# Patient Record
Sex: FEMALE | Race: WHITE
[De-identification: names, ages, dates, MRNs, and addresses within clinical notes are randomized per-mention and may not be internally consistent; named-entity substitution may affect disease eponyms.]

---

## 2017-05-15 ENCOUNTER — HOSPITAL ENCOUNTER (OUTPATIENT)
Dept: HOSPITAL 47 - RADNMMAIN | Age: 72
Discharge: HOME | End: 2017-05-15
Payer: MEDICARE

## 2017-05-15 DIAGNOSIS — R94.31: Primary | ICD-10-CM

## 2017-05-15 PROCEDURE — 93017 CV STRESS TEST TRACING ONLY: CPT

## 2017-05-15 NOTE — EST
DATE OF SERVICE:  5/15/17



AGE:   72Y        SEX:  F        HT:    66"   WT:  132 lbs.           

Protocol Nba: X Other:   Stage: 1 Dur. of Exercise: 2:59



*Heart Rate         Blood Pressure                               

*Rest:  76          Rest:  162/106                               

*                              

*Max. Achieved:     139  Maximum BP:  191/77 

85% PMHR:  125

100% PMHR:  147          



*METS:  4.2    





INDICATIONS:  Abnormal EKG.



MEDICATIONS:   Vitamin D, Synthroid, ASA. 



 Mrs. Mercado is a 72-year-old female with history of about 

hypothyroidism, and has been having some chest discomfort. Patient was 

sent here for cardiac evaluation.  



Baseline EKG showed sinus rhythm with normal PA interval and QRS 

duration. Blood pressure at rest is 160/106 with pulse rate of 76. 

Patient walked on the Nba protocol for about 3 minutes achieving a 

maximum heart rate of 139 with a blood pressure of 190/77. EKGs taken 

during and after the exercise did not reveal any significant changes 

from the baseline. Occasional PVCs were noted.  



FINAL IMPRESSION:

1. Limited exercise capacity. 

2. Negative stress test at the level of exercise that patient achieved. 

3. Patient did not complain of any chest pain or shortness of breath.

## 2017-06-06 ENCOUNTER — HOSPITAL ENCOUNTER (OUTPATIENT)
Dept: HOSPITAL 47 - RADMAMWWP | Age: 72
Discharge: HOME | End: 2017-06-06
Payer: MEDICARE

## 2017-06-06 DIAGNOSIS — Z12.31: Primary | ICD-10-CM

## 2017-06-06 PROCEDURE — 77063 BREAST TOMOSYNTHESIS BI: CPT

## 2017-06-07 NOTE — MM
Reason for exam: screening  (asymptomatic).

Last mammogram was performed 1 year and 1 month ago.



History:

Patient is postmenopausal.

Family history of breast cancer in maternal aunt at age 70.

Excisional biopsy of the left breast, January 23, 2007.  Cancelled Left Mammotome 

of the left breast, January 22, 2007.

Taking estrogen for 16 years beginning at age 46.



Physical Findings:

A clinical breast exam by your physician is recommended on an annual basis and 

results should be correlated with mammographic findings.



MG 3D Screening Mammo W/Cad

Bilateral CC and MLO view(s) were taken.

Prior study comparison: May 18, 2016, bilateral MG 3d diag mammo w/cad [facility].  May 

26, 2015, bilateral MG screening mammo w CAD.

The breast tissue is heterogeneously dense. This may lower the sensitivity of 

mammography.

Finding: There are typically benign vascular calcifications.  There is no discrete

abnormality.

No significant changes in finding since May 18, 2016 and May 26, 2015.





ASSESSMENT: Benign, BI-RAD 2



RECOMMENDATION:

Routine screening mammogram of both breasts in 1 year.

## 2018-01-10 ENCOUNTER — HOSPITAL ENCOUNTER (OUTPATIENT)
Dept: HOSPITAL 47 - RADXRMAIN | Age: 73
Discharge: HOME | End: 2018-01-10
Payer: MEDICARE

## 2018-01-10 DIAGNOSIS — R05: Primary | ICD-10-CM

## 2018-01-10 PROCEDURE — 71046 X-RAY EXAM CHEST 2 VIEWS: CPT

## 2018-01-10 NOTE — XR
EXAMINATION TYPE: XR chest 2V

 

DATE OF EXAM: 1/10/2018

 

COMPARISON: NONE

 

HISTORY: Shortness of breath

 

TECHNIQUE:  Frontal and lateral views of the chest are obtained.

 

FINDINGS:

 

Scattered senescent parenchymal changes noted. Hyperinflation compatible with COPD. 

 

No evidence for infiltrate. No evidence for atelectasis.

 

Heart size is stable.

 

Mediastinal structures are stable and grossly unremarkable.

 

No evidence for hilar prominence.

 

Degenerative changes dorsal spine. 

 

IMPRESSION:

1. No evidence for acute pulmonary disease.

## 2018-06-07 ENCOUNTER — HOSPITAL ENCOUNTER (OUTPATIENT)
Dept: HOSPITAL 47 - RADMAMWWP | Age: 73
Discharge: HOME | End: 2018-06-07
Attending: SURGERY
Payer: MEDICARE

## 2018-06-07 DIAGNOSIS — Z12.31: Primary | ICD-10-CM

## 2018-06-07 PROCEDURE — 77063 BREAST TOMOSYNTHESIS BI: CPT

## 2018-06-07 PROCEDURE — 77067 SCR MAMMO BI INCL CAD: CPT

## 2018-06-11 NOTE — MM
Reason for exam: screening  (asymptomatic).

Last mammogram was performed 1 year ago.



History:

Patient is postmenopausal.

Family history of breast cancer in maternal aunt at age 70 and breast cancer in 

daughter at age 51.

Excisional biopsy of the left breast, January 23, 2007.  Cancelled Left Mammotome 

of the left breast, January 22, 2007.

Took estrogen for 16 years beginning at age 46.



Physical Findings:

A clinical breast exam by your physician is recommended on an annual basis and 

results should be correlated with mammographic findings.



MG 3D Screening Mammo W/Cad

Bilateral CC and MLO view(s) were taken.

Prior study comparison: June 6, 2017, bilateral MG 3d screening mammo w/cad.  May 

18, 2016, bilateral MG 3d diag mammo w/cad [facility].

There are scattered fibroglandular densities.  No significant changes when 

compared with prior studies.





ASSESSMENT: Benign, BI-RAD 2



RECOMMENDATION:

Routine screening mammogram of both breasts in 1 year.

## 2019-07-15 ENCOUNTER — HOSPITAL ENCOUNTER (OUTPATIENT)
Dept: HOSPITAL 47 - RADMAMWWP | Age: 74
Discharge: HOME | End: 2019-07-15
Attending: INTERNAL MEDICINE
Payer: MEDICARE

## 2019-07-15 DIAGNOSIS — Z12.31: Primary | ICD-10-CM

## 2019-07-15 PROCEDURE — 77067 SCR MAMMO BI INCL CAD: CPT

## 2019-07-15 PROCEDURE — 77063 BREAST TOMOSYNTHESIS BI: CPT

## 2019-07-16 NOTE — MM
Reason for exam: screening  (asymptomatic).

Last mammogram was performed 1 year and 1 month ago.



History:

Patient is postmenopausal.

Family history of breast cancer in maternal aunt at age 70 and breast cancer in 

daughter at age 51.

Excisional biopsy of the left breast, January 23, 2007.  Cancelled Left Mammotome 

of the left breast, January 22, 2007.

Took estrogen for 16 years beginning at age 46.



Physical Findings:

A clinical breast exam by your physician is recommended on an annual basis and 

results should be correlated with mammographic findings.



MG 3D Screening Mammo W/Cad

Bilateral CC and MLO view(s) were taken.

Prior study comparison: June 7, 2018, bilateral MG 3d screening mammo w/cad.  June 6, 2017, bilateral MG 3d screening mammo w/cad.

There are scattered fibroglandular densities.  No significant changes when 

compared with prior studies.





ASSESSMENT: Benign, BI-RAD 2



RECOMMENDATION:

Routine screening mammogram of both breasts in 1 year.

## 2020-08-28 ENCOUNTER — HOSPITAL ENCOUNTER (OUTPATIENT)
Dept: HOSPITAL 47 - RADMAMWWP | Age: 75
Discharge: HOME | End: 2020-08-28
Attending: INTERNAL MEDICINE
Payer: MEDICARE

## 2020-08-28 DIAGNOSIS — Z12.31: Primary | ICD-10-CM

## 2020-08-28 PROCEDURE — 77063 BREAST TOMOSYNTHESIS BI: CPT

## 2020-08-28 PROCEDURE — 77067 SCR MAMMO BI INCL CAD: CPT

## 2020-09-01 NOTE — MM
Reason for exam: screening  (asymptomatic).

Last mammogram was performed 1 year and 1 month ago.



History:

Patient is postmenopausal.

Family history of breast cancer in maternal aunt at age 70 and breast cancer in 

daughter at age 51.

Excisional biopsy of the left breast, January 23, 2007.  Cancelled Left Mammotome 

of the left breast, January 22, 2007.

Took estrogen for 16 years beginning at age 46.



Physical Findings:

A clinical breast exam by your physician is recommended on an annual basis and 

results should be correlated with mammographic findings.



MG 3D Screening Mammo W/Cad

Bilateral CC and MLO view(s) were taken.

Prior study comparison: July 15, 2019, bilateral MG 3d screening mammo w/cad.  

June 7, 2018, bilateral MG 3d screening mammo w/cad.

There are scattered fibroglandular densities.  Benign appearing bilateral 

calcifications.  No significant changes when compared with prior studies.





ASSESSMENT: Benign, BI-RAD 2



RECOMMENDATION:

Routine screening mammogram of both breasts in 1 year.

## 2021-09-08 ENCOUNTER — HOSPITAL ENCOUNTER (OUTPATIENT)
Dept: HOSPITAL 47 - RADMAMWWP | Age: 76
Discharge: HOME | End: 2021-09-08
Attending: INTERNAL MEDICINE
Payer: MEDICARE

## 2021-09-08 DIAGNOSIS — Z80.3: ICD-10-CM

## 2021-09-08 DIAGNOSIS — Z12.31: Primary | ICD-10-CM

## 2021-09-08 PROCEDURE — 77067 SCR MAMMO BI INCL CAD: CPT

## 2021-09-08 PROCEDURE — 77063 BREAST TOMOSYNTHESIS BI: CPT

## 2021-09-10 NOTE — MM
Reason for exam: screening  (asymptomatic).

Last mammogram was performed 1 year ago.



History:

Patient is postmenopausal.

Family history of breast cancer in maternal aunt at age 70 and breast cancer in 

daughter at age 51.

Excisional biopsy of the left breast, January 23, 2007.  Cancelled Left Mammotome 

of the left breast, January 22, 2007.

Took estrogen for 16 years beginning at age 46.



Physical Findings:

A clinical breast exam by your physician is recommended on an annual basis and 

results should be correlated with mammographic findings.



MG 3D Screening Mammo W/Cad

Bilateral CC and MLO view(s) were taken.

Prior study comparison: August 28, 2020, bilateral MG 3d screening mammo w/cad.  

July 15, 2019, bilateral MG 3d screening mammo w/cad.

The breast tissue is heterogeneously dense. This may lower the sensitivity of 

mammography.  No significant changes when compared with prior studies.





ASSESSMENT: Negative, BI-RAD 1



RECOMMENDATION:

Routine screening mammogram of both breasts in 1 year.

## 2021-09-20 ENCOUNTER — HOSPITAL ENCOUNTER (OUTPATIENT)
Dept: HOSPITAL 47 - OR | Age: 76
LOS: 1 days | Discharge: HOME | End: 2021-09-21
Attending: SURGERY
Payer: MEDICARE

## 2021-09-20 VITALS — BODY MASS INDEX: 19.3 KG/M2

## 2021-09-20 DIAGNOSIS — E78.5: ICD-10-CM

## 2021-09-20 DIAGNOSIS — K40.90: Primary | ICD-10-CM

## 2021-09-20 DIAGNOSIS — F41.9: ICD-10-CM

## 2021-09-20 DIAGNOSIS — Z82.3: ICD-10-CM

## 2021-09-20 DIAGNOSIS — Z79.899: ICD-10-CM

## 2021-09-20 DIAGNOSIS — R33.9: ICD-10-CM

## 2021-09-20 DIAGNOSIS — Z82.49: ICD-10-CM

## 2021-09-20 DIAGNOSIS — Z90.710: ICD-10-CM

## 2021-09-20 DIAGNOSIS — R03.0: ICD-10-CM

## 2021-09-20 DIAGNOSIS — Z79.890: ICD-10-CM

## 2021-09-20 DIAGNOSIS — Z83.2: ICD-10-CM

## 2021-09-20 DIAGNOSIS — E03.9: ICD-10-CM

## 2021-09-20 DIAGNOSIS — Z79.82: ICD-10-CM

## 2021-09-20 DIAGNOSIS — I10: ICD-10-CM

## 2021-09-20 LAB
ERYTHROCYTE [DISTWIDTH] IN BLOOD BY AUTOMATED COUNT: 4.78 M/UL (ref 3.8–5.4)
ERYTHROCYTE [DISTWIDTH] IN BLOOD: 13.2 % (ref 11.5–15.5)
HCT VFR BLD AUTO: 39.4 % (ref 34–46)
HGB BLD-MCNC: 13.4 GM/DL (ref 11.4–16)
MCH RBC QN AUTO: 28 PG (ref 25–35)
MCHC RBC AUTO-ENTMCNC: 33.9 G/DL (ref 31–37)
MCV RBC AUTO: 82.4 FL (ref 80–100)
PLATELET # BLD AUTO: 185 K/UL (ref 150–450)
WBC # BLD AUTO: 4.7 K/UL (ref 3.8–10.6)

## 2021-09-20 PROCEDURE — 85027 COMPLETE CBC AUTOMATED: CPT

## 2021-09-20 PROCEDURE — 49650 LAP ING HERNIA REPAIR INIT: CPT

## 2021-09-20 PROCEDURE — 87635 SARS-COV-2 COVID-19 AMP PRB: CPT

## 2021-09-20 RX ADMIN — DOCUSATE SODIUM SCH MG: 100 CAPSULE, LIQUID FILLED ORAL at 20:23

## 2021-09-20 RX ADMIN — POTASSIUM CHLORIDE SCH MLS: 14.9 INJECTION, SOLUTION INTRAVENOUS at 08:40

## 2021-09-20 RX ADMIN — DEXTROSE MONOHYDRATE, SODIUM CHLORIDE, AND POTASSIUM CHLORIDE SCH MLS/HR: 50; 4.5; 1.49 INJECTION, SOLUTION INTRAVENOUS at 14:58

## 2021-09-20 RX ADMIN — HEPARIN SODIUM SCH UNIT: 5000 INJECTION INTRAVENOUS; SUBCUTANEOUS at 15:52

## 2021-09-20 NOTE — P.CONS
History of Present Illness





- Reason for Consult


Consult date: 21


Medical management


Requesting physician: Dominic Maddox





- Chief Complaint


Post inguinal hernia repair, urgent hypertension, urinary retention and anx





- History of Present Illness





HISTORY OF PRESENT ILLNESS


76-year-old female one of my office patient for many years who was diagnosed 

with left-sided inguinal hernia recently was referred to Dr. Maddox ended up 

coming for elective surgery today which was done robotically today successfully 

with no major complication as she was in the recovery room her blood pressure 

was very high patient developed urinary retention as well.  Patient has Hairston 

catheter in 1 dose of amlodipine 5 mg was giving patient be admitted to the 

hospital overnight.


Patient is having no chest pain or angina she is not having any headache blurred

vision or dizziness no palpitation.


She has lost few more pounds since last time and seen her, patient was going 

through on oral surgery and was not able to keep anything but clear liquid for a

while.





REVIEW OF SYSTEMS


Constitutional: No fever, no chills, no night sweats.  No weight change.  No 

weakness, fatigue or lethargy.  No daytime sleepiness.


EENT: No headache.  No blurred vision or double vision, no loss of vision.  No 

loss of Hearing, no ringing in the ears, no dizziness.  No nasal drainage or 

congestion.  No epistaxis.  No sore throat.


Lungs: No shortness of breath, cough, no sputum production.  No wheezing.


Cardiovascular: No chest pain, no lower extremity edema.  No palpitations.  No 

paroxysmal nocturnal dyspnea.  No orthopnea.  No lightheadedness or dizziness.  

No syncopal episodes.  Positive urgent hypertension today


Abdominal: No abdominal pain.  No nausea, vomiting.  No diarrhea.  No 

constipation.  No bloody or tarry stools..  No loss of appetite.


Genitourinary: Slight urinary retention.


Musculoskeletal: No myalgias.  No muscle weakness, no gait dysfunction, no 

frequent falls.  No back pain.  No neck pain.


Integumentary: No wounds, no lesions.  No rash or pruritus.  No unusual 

bruising.  No change in hair or nails.


Neurologic: No aphasia. No facial droop. No change in mentation. No head injury.

No headache. No paralysis. No paresthesia.


Psychiatric: No depression.  No anxiety.  No mood swings.


Endocrine: No abnormal blood sugars.  No weight change.  No excessive sweating 

or thirst.  No cold intolerance.  





SOCIAL HISTORY


She does not smoke, she drinks alcohol socially, she is  with aspirin.





FAMILY HISTORY


Her mom  her 90s from atherosclerotic heart disease and stroke, her father 

dying in his 60s from CAD, patient had 1 sister who has CAD, chest 2 children 

with no major medical problem.





PHYSICAL EXAMINATION


Gen: This is a 76-year-old resting comfortably in bed does not look in any 

respiratory distress.


HEENT: Head is atraumatic, normocephalic. Pupils equal, round. Sclerae is 

anicteric. 


NECK: Supple. No JVD. No lymphadenopathy. No thyromegaly. 


LUNGS: Clear to auscultation. No wheezes or rhonchi.  No intercostal 

retractions.


HEART: Regular rate and rhythm. No murmur. 


ABDOMEN: Incision from robotic laparoscopy Nirmala repair looks fine with no 

tenderness rebound rigidity no redness or warmness.


EXTREMITIES: No pedal edema.  No calf tenderness.


NEUROLOGICAL: Patient is awake, alert and oriented x3. Cranial nerves 2 through 

12 are grossly intact. 





ASSESSMENT AND PLAN


1.  Post inguinal hernia repair of the left side: Stable post surgery 

hemodynamically stable with no major complication, pain is under control, 

patient was admitted to the hospital overnight for urgent hypertension was not 

controlled recovery.





2.  Urgent hypertension: Not clear whether's reaction to anesthetic or sedation,

patient was started on amlodipine 5 mg a day we will add hydralazine 25 mg every

6 hour for systolic above 150 or diastolic above 88, continue low-salt diet and 

smaller dose of anti-anxiety medication be use..





3.  Hypothyroidism: Continue levothyroxine 100 g daily.





4.  Hyperlipidemia: On Crestor 5 mg a day resume medication.





5.  Mild anxiety: Will use alprazolam 0.25 mg twice a day as needed.





6.  GI prophylaxis: Start Pepcid 20 mg daily.





7.  DVT prophylaxis: Early mobilization and knee-high SAUMYA hose.





8.  Urinary retention: Probably complication from symptom duration and 

anesthesia along with her hernia surgery Hairston catheter was inserted will be DC 

first thing tomorrow morning patient to do trial to see if she can void if not 

will do straight cath every shift and reinsert the catheter again after few 

hours if needed.





Dr. puente thank you much [facility] the [facility] any further help to please let

me know.





Past Medical History


Past Medical History: Thyroid Disorder


History of Any Multi-Drug Resistant Organisms: None Reported


Past Surgical History: Breast Surgery, Hysterectomy, Orthopedic Surgery


Additional Past Surgical History / Comment(s): BREAST BIOPSY, CATARACT 

SURGERY,BUNIONECTOMY RIGHT FOOT, oral surg.


Past Anesthesia/Blood Transfusion Reactions: No Reported Reaction


Past Psychological History: No Psychological Hx Reported


Smoking Status: Never smoker


Past Alcohol Use History: Occasional


Past Drug Use History: None Reported





- Past Family History


  ** Mother


Family Medical History: CVA/TIA, Deep Vein Thrombosis (DVT)





Medications and Allergies


                                Home Medications











 Medication  Instructions  Recorded  Confirmed  Type


 


Aspirin 325 mg PO DAILY 09/04/15 09/15/21 History


 


Levothyroxine Sodium [Synthroid] 100 mcg PO DAILY 09/04/15 09/15/21 History


 


Magnesium 200 mg PO DAILY 09/15/21 09/15/21 History


 


Rosuvastatin Calcium [Crestor] 5 mg PO DAILY 09/15/21 09/15/21 History








                                    Allergies











Allergy/AdvReac Type Severity Reaction Status Date / Time


 


No Known Allergies Allergy   Verified 21 08:25














Physical Exam


Vitals: 


                                   Vital Signs











  Temp Pulse Pulse Resp BP Pulse Ox


 


 21 13:02   52 L   18  168/94  100


 


 21 12:46   51 L   16  165/84  100


 


 21 12:32   55 L   18  173/89  99


 


 21 12:17   64   18  168/94  99


 


 21 12:02   58 L   18  178/100  95


 


 21 11:47   54 L   18  184/104  96


 


 21 11:31   52 L   18  197/100  99


 


 21 11:17  97.5 F L  87   16  179/111  99


 


 21 09:43      156/80 


 


 21 08:30  97.1 F L   62  15  190/116  98








                                Intake and Output











 21





 22:59 06:59 14:59


 


Intake Total   1350


 


Output Total   605


 


Balance   745


 


Intake:   


 


  IV   1350


 


Output:   


 


  Urine   600


 


  Estimated Blood Loss   5


 


Other:   


 


  Weight   51.1 kg














Results


CBC & Chem 7: 


                                 21 08:38

## 2021-09-20 NOTE — P.OP
Date of Procedure: 09/20/21


Procedure(s) Performed: 


PREOPERATIVE DIAGNOSIS: Bilateral groin hernia (suspected inguinal on left and 

femoral on the right)


POSTOPERATIVE DIAGNOSIS:  Left indirect inguinal hernia


PROCEDURE: LAPAROSCOPIC DA RACHELLE ASSISTED REPAIR LEFT INGUINAL HERNIA WITH MESH 


SURGEON: Dr. Maddox


ANESTHESIA: General


OPERATIVE PROCEDURE DETAILS:  Patient was placed in the operating table in the 

supine position.  The patient was placed under general anesthesia.  The abdomen 

was prepped and draped in usual sterile fashion.  A small curvilinear 

supraumbilical incision was made.  The fascia was retracted anteriorly with 

Turkey forceps.  The Veress needle was inserted.  The saline drop test was 

normal.  Insufflation took place to 15 mmHg. An 8 mm trocar was placed into the 

peritoneal cavity.  2 additional 8 mm trochars were placed in the right upper 

quadrant and left upper quadrant under visualization.  The robotic arms were 

then brought in and docked into place.  The fenestrated bipolar was used in the 

left arm and the laparoscopic jennifer was utilized in the right arm.  A 30 8 mm

scope was used in the up position.  The peritoneal cavity was inspected.  Both 

right and left groin were carefully inspected.  The patient had an obvious 

defect in the indirect space on the left.  No hernia was visualized on the rig

ht-hand side in the direct, indirect, or femoral location.  The patient's 

peritoneum was quite thin and I was able to see behind it quite well and could 

not visualize any preperitoneal fat entering any hernia either.  No surgical 

intervention took place on the right-hand side.  The left side was then 

addressed.  The peritoneum was incised in a horizontal fashion cephalad to the 

internal inguinal ring.  Following that careful dissection of the preperitoneal 

space took place.  This took place using both electrocautery, sharp dissection 

but primarily blunt dissection.  Visualization of the pubic tubercle and 

Blayne's ligament took place medially.  Full dissection took place laterally as 

well.  The hernia sac was fully dissected.  Once we had adequate space the large

Bard 3-D mid mesh was advanced into the preperitoneal space and flattened out 

appropriately to cover all potential hernia sites.  No sutures were used.  The 

peritoneal defect was then closed using a absorbable 2-0 VLok suture.  The 

hernia sac was incorporated into the peritoneal closure to help prevent future 

recurrence.  The pneumoperitoneum was then evacuated.  The skin of all 3 sites 

was closed using a 4-0 Monocryl stitch.  Skin glue was then applied.


HERNIA CHARACTERISTICS:


   Length:  3 cm


   Width:  2 cm


   Type:  Indirect left inguinal


TYPE OF MESH USED:  Large Bard 3-D mid mesh


LOCATION OF MESH:  Preperitoneal


FIXATION:  None


DISPOSITION:  Stable to recovery room

## 2021-09-20 NOTE — P.GSHP
History of Present Illness


H&P Date: 09/20/21


Chief Complaint: Bilateral groin hernia





76-year-old female presented to the office 2 months ago with complaints of a 

bulge left groin.  Present for the last 4-6 months.  Also noticed recently a 

bulge in the right groin.  Mild discomfort on the left, minimally symptomatic on

the right.  Left side since last seen has gotten slightly larger.  No nausea or 

vomiting.  No change in bowel habits.  No previous hernias.





Past Medical History


Past Medical History: Thyroid Disorder


History of Any Multi-Drug Resistant Organisms: None Reported


Past Surgical History: Breast Surgery, Hysterectomy, Orthopedic Surgery


Additional Past Surgical History / Comment(s): BREAST BIOPSY, CATARACT 

SURGERY,BUNIONECTOMY RIGHT FOOT, oral surg.


Past Anesthesia/Blood Transfusion Reactions: No Reported Reaction


Smoking Status: Never smoker





- Past Family History


  ** Mother


Family Medical History: CVA/TIA, Deep Vein Thrombosis (DVT)





Medications and Allergies


                                Home Medications











 Medication  Instructions  Recorded  Confirmed  Type


 


Aspirin 325 mg PO DAILY 09/04/15 09/15/21 History


 


Levothyroxine Sodium [Synthroid] 100 mcg PO DAILY 09/04/15 09/15/21 History


 


Magnesium 200 mg PO DAILY 09/15/21 09/15/21 History


 


Rosuvastatin Calcium [Crestor] 5 mg PO DAILY 09/15/21 09/15/21 History








                                    Allergies











Allergy/AdvReac Type Severity Reaction Status Date / Time


 


No Known Allergies Allergy   Verified 09/20/21 08:25














Surgical - Exam


                                   Vital Signs











Temp Pulse Resp BP Pulse Ox


 


 97.1 F L  62   15   190/116   98 


 


 09/20/21 08:30  09/20/21 08:30  09/20/21 08:30  09/20/21 08:30  09/20/21 08:30

















Physical exam:


General: Well-developed, well-nourished


HEENT: Normocephalic, sclerae nonicteric


Abdomen: Nontender, nondistended, bilateral groin hernia left side more superior

 than right


Extremities: No edema


Neuro: Alert and oriented





Results





- Labs





                                 09/20/21 08:38








Assessment and Plan


(1) Inguinal hernia


Narrative/Plan: 


76-year-old female with bilateral groin hernia.  Suspect inguinal on the left 

and femoral on the right.  We'll proceed with laparoscopic da Elena assisted 

repair of bilateral groin hernia with mesh, possible open.  Risks of bleeding, 

infection, recurrence, bladder and bowel injury, numbness, nerve injury, 

conversion to an open procedure were discussed with the patient.  The patient 

understands and wishes to proceed.


Current Visit: Yes   Status: Acute   Code(s): K40.90 - UNIL INGUINAL HERNIA, W/O

 OBST OR GANGR, NOT SPCF AS RECUR   SNOMED Code(s): 137913989

## 2021-09-21 VITALS — TEMPERATURE: 97.7 F | DIASTOLIC BLOOD PRESSURE: 71 MMHG | HEART RATE: 60 BPM | SYSTOLIC BLOOD PRESSURE: 123 MMHG

## 2021-09-21 VITALS — RESPIRATION RATE: 16 BRPM

## 2021-09-21 RX ADMIN — HEPARIN SODIUM SCH UNIT: 5000 INJECTION INTRAVENOUS; SUBCUTANEOUS at 08:42

## 2021-09-21 RX ADMIN — POTASSIUM CHLORIDE SCH: 14.9 INJECTION, SOLUTION INTRAVENOUS at 09:59

## 2021-09-21 RX ADMIN — HEPARIN SODIUM SCH UNIT: 5000 INJECTION INTRAVENOUS; SUBCUTANEOUS at 00:55

## 2021-09-21 RX ADMIN — DOCUSATE SODIUM SCH MG: 100 CAPSULE, LIQUID FILLED ORAL at 08:42

## 2021-09-21 RX ADMIN — DEXTROSE MONOHYDRATE, SODIUM CHLORIDE, AND POTASSIUM CHLORIDE SCH MLS/HR: 50; 4.5; 1.49 INJECTION, SOLUTION INTRAVENOUS at 05:45

## 2021-09-21 NOTE — P.DS
<Emelyn Marley - Last Filed: 09/21/21 12:26>





Providers


Expected date of discharge: 09/21/21


Hospital Course: 





Discharge diagnosis


1.  Left indirect inguinal hernia status post laparoscopic da Elena-assisted 

repair of left inguinal hernia with mesh





Hospital course


76-year-old female presented to the office 2 months ago with complaints of a 

bulge left groin.  Present for the last 4-6 months.  Also noticed recently a 

bulge in the right groin.  Mild discomfort on the left, minimally symptomatic on

the right.  Patient is status post laparoscopic da Elena-assisted repair of left

inguinal hernia with mesh.  Patient tolerated surgery well.  She denies any 

pain.  Denies any nausea or vomiting.  She is tolerating diet.  She did have a 

small smear of the bowel movement.  She is urinating without difficulty.  She is

afebrile.  She is up and ambulating.  Medicine service has added blood pressure 

medication for her elevated blood pressures.  Patient's incision sites are clean

dry and intact.  She does have minimal bruising around the incision at the 

umbilicus.  She is stable for discharge.





Physician Assistant note has been reviewed by physician. Signing provider agrees

with the documented findings, assessment, and plan of care. 





Patient Condition at Discharge: Good





Plan - Discharge Summary


Discharge Rx Participant: Yes


New Discharge Prescriptions: 


New


   Docusate [Colace] 100 mg PO BID  cap


   amLODIPine [Norvasc] 5 mg PO DAILY #30 tab


   Acetaminophen Tab [Tylenol Tab] 650 mg PO Q4H PRN #30 tablet


     PRN Reason: Pain





Continue


   Levothyroxine Sodium [Synthroid] 100 mcg PO DAILY


   Aspirin 325 mg PO DAILY


   Rosuvastatin Calcium [Crestor] 5 mg PO DAILY


   Magnesium 200 mg PO DAILY


Discharge Medication List





Aspirin 325 mg PO DAILY 09/04/15 [History]


Levothyroxine Sodium [Synthroid] 100 mcg PO DAILY 09/04/15 [History]


Magnesium 200 mg PO DAILY 09/15/21 [History]


Rosuvastatin Calcium [Crestor] 5 mg PO DAILY 09/15/21 [History]


Acetaminophen Tab [Tylenol Tab] 650 mg PO Q4H PRN #30 tablet 09/21/21 [Rx]


Docusate [Colace] 100 mg PO BID  cap 09/21/21 [Rx]


amLODIPine [Norvasc] 5 mg PO DAILY #30 tab 09/21/21 [Rx]








Follow up Appointment(s)/Referral(s): 


Dominic Maddox MD [Medical Doctor] - 09/29/21 11:30 am


Frankie Hay MD [Primary Care Provider] - 09/28/21 11:30 am (monitor BP at home)


Activity/Diet/Wound Care/Special Instructions: 


Continue regular diet as tolerated. fluids are encouraged. Continue taking the 

prescribed blood pressure medication as prescribed until you follow up with Dr. Hay next week. 


No lifting pushing or pulling over 10 pounds


You may shower. No soaking or tub baths for 2 weeks


Very light activity until you are reevaluated at your follow up appointment with

your surgeon. 


Call physician with any questions comments concerns worsening returning 

symptoms, fever 101.1 or higher, not tolerating diet or fluids, pain not 

controlled by medications prescribed to you. 


Discharge Disposition: HOME SELF-CARE





<Dominic Maddox - Last Filed: 09/21/21 13:12>





Providers


Attending physician: 


Dominic Maddox





Consults: 





                                        





09/20/21 11:25


Consult Physician Routine 


   Consulting Provider: Frankie Hay


   Consult Reason/Comments: Medical management


   Do you want consulting provider notified?: Yes











Primary care physician: 


Frankie Hay








- Discharge Diagnosis(es)


(1) Inguinal hernia


Status: Acute

## 2021-09-24 NOTE — P.PN
Subjective


Progress Note Date: 09/21/21





HISTORY OF PRESENT ILLNESS


76-year-old female one of my office patient for many years who was diagnosed 

with left-sided inguinal hernia recently was referred to Dr. Maddox ended up 

coming for elective surgery today which was done robotically today successfully 

with no major complication as she was in the recovery room her blood pressure 

was very high patient developed urinary retention as well.  Patient has Hairston 

catheter in 1 dose of amlodipine 5 mg was giving patient be admitted to the 

hospital overnight.


Patient is having no chest pain or angina she is not having any headache blurred

vision or dizziness no palpitation.


She has lost few more pounds since last time and seen her, patient was going 

through on oral surgery and was not able to keep anything but clear liquid for a

while.





9/21: Patient has been afebrile, heart rate 60, blood pressure 123/71, pulse ox 

99% on room air.  Patient was started on Norvasc blood pressure has been well 

controlled overnight.  She is expecting discharge home today once seen by Dr. Maddox.  Medication reconciliation has been completed.  Patient may follow-up in 

the office in a week and recommend monitoring blood pressure.





REVIEW OF SYSTEMS


Constitutional: No fever, no chills, no night sweats.  No weight change.  No 

weakness, fatigue or lethargy.  No daytime sleepiness.


EENT: No headache.  No blurred vision or double vision, no loss of vision.  No 

loss of Hearing, no ringing in the ears, no dizziness.  No nasal drainage or 

congestion.  No epistaxis.  No sore throat.


Lungs: No shortness of breath, cough, no sputum production.  No wheezing.


Cardiovascular: No chest pain, no lower extremity edema.  No palpitations.  No 

paroxysmal nocturnal dyspnea.  No orthopnea.  No lightheadedness or dizziness.  

No syncopal episodes.  Positive urgent hypertension today


Abdominal: No abdominal pain.  No nausea, vomiting.  No diarrhea.  No 

constipation.  No bloody or tarry stools..  No loss of appetite.


Genitourinary: Slight urinary retention.


Musculoskeletal: No myalgias.  No muscle weakness, no gait dysfunction, no 

frequent falls.  No back pain.  No neck pain.


Integumentary: No wounds, no lesions.  No rash or pruritus.  No unusual 

bruising.  No change in hair or nails.


Neurologic: No aphasia. No facial droop. No change in mentation. No head injury.

No headache. No paralysis. No paresthesia.


Psychiatric: No depression.  No anxiety.  No mood swings.


Endocrine: No abnormal blood sugars.  No weight change.  





PHYSICAL EXAMINATION


Gen: This is a 76-year-old female resting in recliner and appears to be in no 

acute distress. 


HEENT: Head is atraumatic, normocephalic. Pupils equal, round. Sclerae is 

anicteric. 


NECK: Supple. No JVD. No lymphadenopathy. No thyromegaly. 


LUNGS: Clear to auscultation. No wheezes or rhonchi.  No intercostal 

retractions.


HEART: Regular rate and rhythm. No murmur. 


ABDOMEN: Incision from robotic laparoscopic Da Elena repair looks fine with no 

tenderness rebound rigidity no redness or warmness.


EXTREMITIES: No pedal edema.  No calf tenderness.


NEUROLOGICAL: Patient is awake, alert and oriented x3. Cranial nerves 2 through 

12 are grossly intact. 





ASSESSMENT AND PLAN


1.  Post inguinal hernia repair of the left side: Stable post surgery 

hemodynamically stable with no major complication, pain is under control, p

megient was admitted to the hospital overnight for urgent hypertension was not 

controlled recovery.





2.  Urgent hypertension: Not clear whether's reaction to anesthetic or sedation,

patient was started on amlodipine 5 mg a day we will add hydralazine 25 mg every

6 hour for systolic above 150 or diastolic above 88, continue low-salt diet and 

smaller dose of anti-anxiety medication be use..





3.  Hypothyroidism: Continue levothyroxine 100 g daily.





4.  Hyperlipidemia: On Crestor 5 mg a day resume medication.





5.  Mild anxiety: Will use alprazolam 0.25 mg twice a day as needed.





6.  GI prophylaxis: Start Pepcid 20 mg daily.





7.  DVT prophylaxis: Early mobilization and knee-high SAUMYA hose.





8.  Urinary retention: Probably complication from symptom duration and 

anesthesia along with her hernia surgery Hairston catheter was inserted will be DC 

first thing tomorrow morning patient to do trial to see if she can void if not 

will do straight cath every shift and reinsert the catheter again after few 

hours if needed.





9.  Hypertension. Patient started on Norvasc.





Dr. Maddox, thank you much for the consultation and if I can be of any further 

help please let me know.





DISCHARGE PLAN


Home 





Impression and plan of care have been directed as dictated by the signing 

physician.  Iva Zacarias nurse practitioner acting as scribe for signing 

physician.





Objective





- Vital Signs


Vital signs: 


                                   Vital Signs











Temp  97.7 F   09/21/21 07:45


 


Pulse  60   09/21/21 07:45


 


Resp  16   09/21/21 07:45


 


BP  123/71   09/21/21 07:45


 


Pulse Ox  99   09/21/21 07:45








                                 Intake & Output











 09/20/21 09/21/21 09/21/21





 18:59 06:59 18:59


 


Intake Total 1350 750 


 


Output Total 2225 1600 


 


Balance -875 -850 


 


Weight 51.1 kg  


 


Intake:   


 


  IV 1350  


 


  Intake, IV Titration  750 





  Amount   


 


    D5-0.45% NaCl with KCl  750 





    20Meq/l 1,000 ml @ 75 mls   





    /hr IV .[id number] [facility] Rx#:   





    [id number]   


 


Output:   


 


  Urine 2220 1600 


 


  Estimated Blood Loss 5  


 


Other:   


 


  Voiding Method  Indwelling Catheter 


 


  # Voids 1  














- Labs


CBC & Chem 7: 


                                 09/20/21 08:38

## 2022-04-23 ENCOUNTER — HOSPITAL ENCOUNTER (OUTPATIENT)
Dept: HOSPITAL 47 - LABWHC1 | Age: 77
Discharge: HOME | End: 2022-04-23
Attending: PSYCHIATRY & NEUROLOGY
Payer: MEDICARE

## 2022-04-23 DIAGNOSIS — Z00.00: Primary | ICD-10-CM

## 2022-04-23 LAB — VIT B12 SERPL-MCNC: 406 PG/ML (ref 200–944)

## 2022-04-23 PROCEDURE — 82306 VITAMIN D 25 HYDROXY: CPT

## 2022-04-23 PROCEDURE — 82607 VITAMIN B-12: CPT

## 2022-04-23 PROCEDURE — 85652 RBC SED RATE AUTOMATED: CPT

## 2022-04-23 PROCEDURE — 86038 ANTINUCLEAR ANTIBODIES: CPT

## 2022-04-23 PROCEDURE — 36415 COLL VENOUS BLD VENIPUNCTURE: CPT

## 2022-04-29 ENCOUNTER — HOSPITAL ENCOUNTER (OUTPATIENT)
Dept: HOSPITAL 47 - RADMRIMAIN | Age: 77
Discharge: HOME | End: 2022-04-29
Attending: PSYCHIATRY & NEUROLOGY
Payer: MEDICARE

## 2022-04-29 DIAGNOSIS — G46.3: Primary | ICD-10-CM

## 2022-04-29 PROCEDURE — 70551 MRI BRAIN STEM W/O DYE: CPT

## 2022-04-29 NOTE — MR
MR brain without contrast

 

HISTORY: G 46.3

 

Multiplanar multisequence imaging through the brain, no comparisons

 

There is no restricted diffusion. Cortical atrophy is present. Periventricular and subcortical conflu
ent and scattered hyperintensities are noted on inversion recovery and T2-weighted sequences. There i
s no hemorrhage or hydrocephalus. The corpus callosum, cervical medullary junction are within normal 
limits. Degenerative disc changes are noted in the upper cervical spine. There is a partially empty s
haven. Prominent CSF fluid space in the posterior fossa may represent negative cisterna magna rather t
huerta arachnoid cyst. The orbits show symmetric appearance. Paranasal sinuses are well aerated. Cerebel
lopontine angles are unremarkable. There are expected vascular flow voids. There is some inflammatory
 change present in the left maxillary sinus

 

IMPRESSION: Age-related changes of cortical atrophy and small vessel ischemia. Sinus disease

## 2022-09-15 ENCOUNTER — HOSPITAL ENCOUNTER (OUTPATIENT)
Dept: HOSPITAL 47 - RADMAMWWP | Age: 77
Discharge: HOME | End: 2022-09-15
Attending: INTERNAL MEDICINE
Payer: MEDICARE

## 2022-09-15 DIAGNOSIS — Z12.31: Primary | ICD-10-CM

## 2022-09-15 PROCEDURE — 77063 BREAST TOMOSYNTHESIS BI: CPT

## 2022-09-15 PROCEDURE — 77067 SCR MAMMO BI INCL CAD: CPT

## 2022-09-16 NOTE — MM
Reason for Exam: Screening  (asymptomatic). 

Last screening mammogram was performed 12 month(s) ago.





Patient History: 

Menarche at age 11. First Full-Term Pregnancy at age 17. Left ovary removed at age 33. Right ovary

removed at age 33. Hysterectomy at age 33. Postmenopausal. Estrogen, starting at age 46 for 16

years. 01/23/2007, Excisional Biopsy on the Left side. 1/22/2007, Cancelled Left Mammotome on the

left side.

Maternal aunt had breast cancer, age 70. Daughter had breast cancer, age 51. 





Risk Values: 

Caitlin 5 year model risk: 3.2%.

NCI Lifetime model risk: 5.7%.





Prior Study Comparison: 

7/15/2019 Bilateral Screening Mammogram, [facility]. 8/28/2020 Bilateral Screening Mammogram, [facility]. 9/8/2021

Bilateral Screening Mammogram, [facility]. 





Tissue Density: 

The breast tissue is heterogeneously dense. This may lower the sensitivity of mammography.





Findings: 

Analyzed By CAD. 

There is no suspicious group of microcalcifications or new suspicious mass in either breast. 





Overall Assessment: Negative, BI-RAD 1





Management: 

Screening Mammogram of both breasts in 1 year.

A clinical breast exam by your physician is recommended on an annual basis and results should be

correlated with mammographic findings.



Electronically signed and approved by: Zachariah Nelson DO

## 2022-10-03 ENCOUNTER — HOSPITAL ENCOUNTER (OUTPATIENT)
Dept: HOSPITAL 47 - RADBDWWP | Age: 77
Discharge: HOME | End: 2022-10-03
Attending: INTERNAL MEDICINE
Payer: MEDICARE

## 2022-10-03 ENCOUNTER — HOSPITAL ENCOUNTER (OUTPATIENT)
Dept: HOSPITAL 47 - RADXRMAIN | Age: 77
Discharge: HOME | End: 2022-10-03
Attending: INTERNAL MEDICINE
Payer: MEDICARE

## 2022-10-03 DIAGNOSIS — M54.9: Primary | ICD-10-CM

## 2022-10-03 DIAGNOSIS — M81.0: Primary | ICD-10-CM

## 2022-10-03 PROCEDURE — 72100 X-RAY EXAM L-S SPINE 2/3 VWS: CPT

## 2022-10-03 PROCEDURE — 72070 X-RAY EXAM THORAC SPINE 2VWS: CPT

## 2022-10-03 PROCEDURE — 77080 DXA BONE DENSITY AXIAL: CPT

## 2022-10-04 NOTE — XR
EXAMINATION TYPE: XR lumbar spine 2 or 3V

 

DATE OF EXAM: 10/3/2022

 

CLINICAL HISTORY: Low back pain.

 

TECHNIQUE: Frontal and lateral images of the lumbar spine are obtained.

 

COMPARISON: None

 

FINDINGS:  There are 5 lumbar type vertebral bodies identified. There is dextroconvex scoliosis cente
red at L2 level with reactive levoconvex scoliosis centered at lumbosacral junction. Alignment is str
aightened on the lateral images with grade 1 anterolisthesis L4 on L5. Vertebral body heights are dacia
ntained. Moderate disc space narrowing with vacuum disc phenomenon at L4-L5 level. Moderate disc spac
e narrowing L5-S1 level. Mild to moderate disc space narrowing at L2-L3 level with vacuum disc phenom
enon. Mild overlying arterial vascular calcification is present.

 

IMPRESSION: As above.

## 2022-10-04 NOTE — BD
EXAMINATION TYPE: Axial Bone Density

 

DATE OF EXAM: 10/3/2022

 

COMPARISON: DEXA bone scan 2014

 

CLINICAL HISTORY: 77 years year old Female.  ICD-10 CODE: M81.0 AGE-RELATED OSTEOPOROSIS W/O CURRENT 
PATHOLO

 

Height:  62

Weight:  108.5

 

FRAX RISK QUESTIONS:

Alcohol (3 or more units per day):  NO

Family History (Parent hip fracture): MOTHER

Glucocorticoids (More than 3mos):  NO

History of Fracture in Adulthood: NO

 

Secondary Osteoporosis:

  1.  Type 1 Diabetes: NO

  2.  Hyperthyroidism: NO

  3.  Menopause before 45: YES

  4.  Malnutrition: SLIGHT IN STATURE

  5.  Chronic liver disease: NO

Rheumatoid Arthritis: NO

Current Tobacco Use: NO

 

RISK FACTORS 

HISTORY OF: 

Hip Fracture (Right/Left): NO

Spine Fracture: NO

History of Wrist Fracture: NO

Surgery to Spine/Hip(right/left)/Wrist (right/left): NO

Family History of Osteoporosis: YES

Active: YES

Diet low in dairy products/other sources of calcium:  NO

Postmenopausal woman: YES

Take estrogen and/or progesterone medications: NO

Lost more than 2 inches in height since high school: YES

Frequent falls: NO

Poor Health: NO

Hyperparathyroidism: NO

Adrenal Insufficiency: NO

 

MEDICATIONS: 

Prednisone or other steroids: NO

Thyroid Medications:  NO

Osteoporosis Medications: NO

Additional Medications: VIT D, CALCIUM, PT IS NOT SURE OF PRESCRIPTION MEDS

 

 

Additional History:

 

 

EXAM MEASUREMENTS: 

Bone mineral densitometry was performed using the [employer] System.

Bone mineral density as measured about the Lumbar spine is:  

----- L1-L4(G/cm2):0.800

T Score Values are as follows:

----- L1: -3.1

----- L2: -3.4

----- L3: -2.8

----- L4: -3.2

----- L1-L4: -3.2

Bone mineral density has: DECREASED 31.6 % since study of: 09/10/2014

 

Bone mineral density about the R hip (g/cm2): 0.762

Bone mineral density about the L hip (g/cm2): 0.726

T Score values are as follows:

-----R Neck: -2.0

-----L Neck: -2.2

-----R Total: -2.8

-----L Total: -3.1

Bone mineral density has: DECREASED 24.4% % since study of: 09/10.2014

 

FRAX%s: The graph provided illustrates a 27.7% chance for a major osteoporotic fx and a 18.9% chance 
for the hips probability for fx in 10 years time.

 

IMPRESSION:

Osteoporosis (T Score less than -2.5) now seen.

 

There is increased fracture risk and therapy is usually indicated based on age.

 

Re-Screen 1-2 years.

 

NOTE:  T-SCORE=SD OF THE YOUNG ADULT MEAN.

## 2022-10-04 NOTE — XR
EXAMINATION TYPE: XR thoracic spine 2V

 

DATE OF EXAM: 10/3/2022

 

CLINICAL HISTORY: Dorsalis unspecified.

 

TECHNIQUE: Frontal, lateral, and swimmer's view of thoracic spine are obtained.  

 

COMPARISON: None.

 

FINDINGS: Thoracic spine show slight scoliotic curvature and frontal images with some exaggerated tho
racic kyphosis upper to mid thoracic spine on lateral images. Mild multilevel disc space narrowing in
 the upper to mid thoracic spine. Osseous structures somewhat demineralized. Vertebral body heights a
re preserved. Visualized ribs are intact bilaterally.   

 

IMPRESSION: As above.

## 2023-05-01 ENCOUNTER — HOSPITAL ENCOUNTER (OUTPATIENT)
Dept: HOSPITAL 47 - LABWHC1 | Age: 78
Discharge: HOME | End: 2023-05-01
Attending: INTERNAL MEDICINE
Payer: MEDICARE

## 2023-05-01 DIAGNOSIS — E03.9: ICD-10-CM

## 2023-05-01 DIAGNOSIS — E78.5: ICD-10-CM

## 2023-05-01 DIAGNOSIS — Z00.00: Primary | ICD-10-CM

## 2023-05-01 LAB
ALBUMIN SERPL-MCNC: 4.2 G/DL (ref 3.8–4.9)
ALBUMIN/GLOB SERPL: 2.13 G/DL (ref 1.6–3.17)
ALP SERPL-CCNC: 66 U/L (ref 41–126)
ALT SERPL-CCNC: 15 U/L (ref 8–44)
ANION GAP SERPL CALC-SCNC: 8.5 MMOL/L (ref 10–18)
AST SERPL-CCNC: 22 U/L (ref 13–35)
BASOPHILS # BLD AUTO: 0.04 X 10*3/UL (ref 0–0.1)
BASOPHILS NFR BLD AUTO: 0.8 %
BUN SERPL-SCNC: 24.2 MG/DL (ref 9–27)
BUN/CREAT SERPL: 28.64 RATIO (ref 12–20)
CALCIUM SPEC-MCNC: 9.8 MG/DL (ref 8.7–10.3)
CHLORIDE SERPL-SCNC: 105 MMOL/L (ref 96–109)
CHOLEST SERPL-MCNC: 184 MG/DL (ref 0–200)
CO2 SERPL-SCNC: 29.5 MMOL/L (ref 20–27.5)
EOSINOPHIL # BLD AUTO: 0.09 X 10*3/UL (ref 0.04–0.35)
EOSINOPHIL NFR BLD AUTO: 1.7 %
ERYTHROCYTE [DISTWIDTH] IN BLOOD BY AUTOMATED COUNT: 4.78 X 10*6/UL (ref 4.1–5.2)
ERYTHROCYTE [DISTWIDTH] IN BLOOD: 14 % (ref 11.5–14.5)
GLOBULIN SER CALC-MCNC: 2 G/DL (ref 1.6–3.3)
GLUCOSE SERPL-MCNC: 95 MG/DL (ref 70–110)
HCT VFR BLD AUTO: 41 % (ref 37.2–46.3)
HDLC SERPL-MCNC: 101 MG/DL (ref 40–60)
HGB BLD-MCNC: 12.3 G/DL (ref 12–15)
IMM GRANULOCYTES BLD QL AUTO: 0.2 %
LDLC SERPL CALC-MCNC: 70.2 MG/DL (ref 0–131)
LYMPHOCYTES # SPEC AUTO: 1.21 X 10*3/UL (ref 0.9–5)
LYMPHOCYTES NFR SPEC AUTO: 23 %
MCH RBC QN AUTO: 25.7 PG (ref 27–32)
MCHC RBC AUTO-ENTMCNC: 30 G/DL (ref 32–37)
MCV RBC AUTO: 85.8 FL (ref 80–97)
MONOCYTES # BLD AUTO: 0.45 X 10*3/UL (ref 0.2–1)
MONOCYTES NFR BLD AUTO: 8.6 %
NEUTROPHILS # BLD AUTO: 3.46 X 10*3/UL (ref 1.8–7.7)
NEUTROPHILS NFR BLD AUTO: 65.7 %
NRBC BLD AUTO-RTO: 0 /100 WBCS (ref 0–0)
PLATELET # BLD AUTO: 251 X 10*3/UL (ref 140–440)
POTASSIUM SERPL-SCNC: 4.8 MMOL/L (ref 3.5–5.5)
PROT SERPL-MCNC: 6.2 G/DL (ref 6.2–8.2)
SODIUM SERPL-SCNC: 143 MMOL/L (ref 135–145)
T4 FREE SERPL-MCNC: 0.79 NG/DL (ref 0.8–1.8)
TRIGL SERPL-MCNC: 63.9 MG/DL (ref 0–149)
VLDLC SERPL CALC-MCNC: 12.78 MG/DL (ref 5–40)
WBC # BLD AUTO: 5.26 X 10*3/UL (ref 4.5–10)

## 2023-05-01 PROCEDURE — 84439 ASSAY OF FREE THYROXINE: CPT

## 2023-05-01 PROCEDURE — 80053 COMPREHEN METABOLIC PANEL: CPT

## 2023-05-01 PROCEDURE — 84443 ASSAY THYROID STIM HORMONE: CPT

## 2023-05-01 PROCEDURE — 85025 COMPLETE CBC W/AUTO DIFF WBC: CPT

## 2023-05-01 PROCEDURE — 36415 COLL VENOUS BLD VENIPUNCTURE: CPT

## 2023-05-01 PROCEDURE — 80061 LIPID PANEL: CPT

## 2023-10-12 ENCOUNTER — HOSPITAL ENCOUNTER (EMERGENCY)
Dept: HOSPITAL 47 - EC | Age: 78
Discharge: HOME | End: 2023-10-12
Payer: MEDICARE

## 2023-10-12 VITALS — SYSTOLIC BLOOD PRESSURE: 151 MMHG | RESPIRATION RATE: 16 BRPM | DIASTOLIC BLOOD PRESSURE: 76 MMHG

## 2023-10-12 VITALS — TEMPERATURE: 98 F | HEART RATE: 72 BPM

## 2023-10-12 DIAGNOSIS — Z23: ICD-10-CM

## 2023-10-12 DIAGNOSIS — E07.9: ICD-10-CM

## 2023-10-12 DIAGNOSIS — Z79.82: ICD-10-CM
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
## 2023-10-12 DIAGNOSIS — Z79.890: ICD-10-CM

## 2023-10-12 DIAGNOSIS — S01.111A: Primary | ICD-10-CM

## 2023-10-12 PROCEDURE — 70450 CT HEAD/BRAIN W/O DYE: CPT

## 2023-10-12 PROCEDURE — 90715 TDAP VACCINE 7 YRS/> IM: CPT

## 2023-10-12 PROCEDURE — 12013 RPR F/E/E/N/L/M 2.6-5.0 CM: CPT

## 2023-10-12 PROCEDURE — 99283 EMERGENCY DEPT VISIT LOW MDM: CPT

## 2023-10-12 PROCEDURE — 72125 CT NECK SPINE W/O DYE: CPT

## 2023-10-12 PROCEDURE — 90471 IMMUNIZATION ADMIN: CPT

## 2023-10-12 NOTE — ED
General Adult HPI





- General


Chief complaint: Skin/Abscess/Foreign Body


Stated complaint: Fall


Time Seen by Provider: 10/12/23 19:22


Source: family


Mode of arrival: wheelchair


Limitations: no limitations





- History of Present Illness


Initial comments: 





78-year-old female presents emergency department chief complaint of fall.  

Patient states that she was walking in the garage earlier today when she missed 

a small step causing her to fall forward and hit her face on a step in the 

garage.  She states that she did not lose consciousness.  Denies blood thinners.

 She is unsure when her last tetanus vaccination was.  Denies any other injury. 

She has been ambulatory since this incident.





- Related Data


                                Home Medications











 Medication  Instructions  Recorded  Confirmed


 


Aspirin 325 mg PO DAILY 09/04/15 09/15/21


 


Levothyroxine Sodium [Synthroid] 100 mcg PO DAILY 09/04/15 09/15/21


 


Magnesium 200 mg PO DAILY 09/15/21 09/15/21


 


Rosuvastatin Calcium [Crestor] 5 mg PO DAILY 09/15/21 09/15/21








                                  Previous Rx's











 Medication  Instructions  Recorded


 


Acetaminophen Tab [Tylenol Tab] 650 mg PO Q4H PRN #30 tablet 09/21/21


 


Docusate [Colace] 100 mg PO BID  cap 09/21/21


 


amLODIPine [Norvasc] 5 mg PO DAILY #30 tab 09/21/21











                                    Allergies











Allergy/AdvReac Type Severity Reaction Status Date / Time


 


No Known Allergies Allergy   Verified 10/12/23 19:20














Review of Systems


ROS Statement: 


Those systems with pertinent positive or pertinent negative responses have been 

documented in the HPI.





ROS Other: All systems not noted in ROS Statement are negative.





Past Medical History


Past Medical History: Thyroid Disorder


History of Any Multi-Drug Resistant Organisms: None Reported


Past Surgical History: Breast Surgery, Hysterectomy, Orthopedic Surgery


Additional Past Surgical History / Comment(s): BREAST BIOPSY, CATARACT 

SURGERY,BUNIONECTOMY RIGHT FOOT,


Past Anesthesia/Blood Transfusion Reactions: No Reported Reaction


Past Psychological History: No Psychological Hx Reported


Smoking Status: Never smoker


Past Alcohol Use History: Occasional


Past Drug Use History: None Reported





- Past Family History


  ** Mother


Family Medical History: CVA/TIA, Deep Vein Thrombosis (DVT)





General Exam


Limitations: no limitations


General appearance: alert, in no apparent distress


Head exam: Present: normocephalic, other (5cm laceration above right eyebrow)


Eye exam: Present: normal appearance, PERRL, EOMI.  Absent: scleral icterus, 

conjunctival injection, periorbital swelling


ENT exam: Present: normal exam, mucous membranes moist


Neck exam: Present: normal inspection.  Absent: tenderness, meningismus, 

lymphadenopathy


Respiratory exam: Present: normal lung sounds bilaterally.  Absent: respiratory 

distress, wheezes, rales, rhonchi, stridor


Cardiovascular Exam: Present: regular rate, normal rhythm, normal heart sounds. 

 Absent: systolic murmur, diastolic murmur, rubs, gallop, clicks


GI/Abdominal exam: Present: soft, normal bowel sounds.  Absent: distended, 

tenderness, guarding, rebound, rigid


Extremities exam: Present: normal inspection, full ROM, normal capillary refill.

  Absent: tenderness, pedal edema, joint swelling, calf tenderness


Back exam: Present: normal inspection


Neurological exam: Present: alert, oriented X3, CN II-XII intact, normal gait


Psychiatric exam: Present: normal affect, normal mood


Skin exam: Present: warm, dry, intact, normal color.  Absent: rash





Course


                                   Vital Signs











  10/12/23 10/12/23





  19:18 21:57


 


Temperature 98 F 


 


Pulse Rate 72 72


 


Respiratory 18 16





Rate  


 


Blood Pressure 175/97 151/76


 


O2 Sat by Pulse 100 98





Oximetry  














Procedures





- Laceration


  ** Laceration #1


Consent Obtained: verbal consent


Indication: laceration


Site: face


Size (cm): 5


Description: linear


Depth: simple, single layer


Anesthetic Used: lidocaine 1%


Anesthesia Technique: local infiltration


Pre-repair: wound explored


Type of Sutures: other


Size of Sutures: 6-0


Technique: simple, interrupted


Patient Tolerated Procedure: well, no complications





Medical Decision Making





- Medical Decision Making





Was pt. sent in by a medical professional or institution (, PA, NP, urgent 

care, hospital, or nursing home...) When possible be specific


@  -No


Did you speak to anyone other than the patient for history (EMS, parent, family,

 police, friend...)? What history was obtained from this source 


@  -No


Did you review nursing and triage notes (agree or disagree)?  Why? 


@  -I reviewed and agree with nursing and triage notes


Were old charts reviewed (outside hosp., previous admission, EMS record, old 

EKG, old radiological studies, urgent care reports/EKG's, nursing home records)?

 Report findings 


@  -No old charts were reviewed


Differential Diagnosis (chest pain, altered mental status, abdominal pain women,

 abdominal pain men, vaginal bleeding, weakness, fever, dyspnea, syncope, 

headache, dizziness, GI bleed, back pain, seizure, CVA, palpatations, mental 

health, musculoskeletal)? 


@  -laceration, abrasion, fracture, head injury, intracranial hemorrhage, this 

list is not all inclusive


EKG interpreted by me (3pts min.).


@  -none


X-rays interpreted by me (1pt min.).


@  -None done


CT interpreted by me (1pt min.).


@  -CT brain and cspine shows no evidence of acute intracranial process, no 

acute fracture


U/S interpreted by me (1pt. min.).


@  -None done


What testing was considered but not performed or refused? (CT, X-rays, U/S, 

labs)? Why?


@  -None


What meds were considered but not given or refused? Why?


@  -None


Did you discuss the management of the patient with other professionals 

(professionals i.e. , PA, NP, lab, RT, psych nurse, , , 

teacher, , )? Give summary


@  -No


Was smoking cessation discussed for >3mins.?


@  -No


Was critical care preformed (if so, how long)?


@  -No


Were there social determinants of health that impacted care today? How? 

(Homelessness, low income, unemployed, alcoholism, drug addiction, 

transportation, low edu. Level, literacy, decrease access to med. care, FDC, 

rehab)?


@  -No


Was there de-escalation of care discussed even if they declined (Discuss DNR or 

withdrawal of care, Hospice)? DNR status


@  -No


What co-morbidities impacted this encounter? (DM, HTN, Smoking, COPD, CAD, 

Cancer, CVA, ARF, Chemo, Hep., AIDS, mental health diagnosis, sleep apnea, 

morbid obesity)?


@  -None


Was patient admitted / discharged? Hospital course, mention meds given and 

route, prescriptions, significant lab abnormalities, going to OR and other 

pertinent info.


@  -Discharge.  78-year-old female presents emergency Department with chief 

complaint of mechanical fall in which she tripped in the garage causing her to 

hit her face on the stairs.  She did not lose consciousness denies blood 

thinners. CT brain and cspine shows no evidence of acute intracranial process, 

no acute fracture. patient is moving all extremities freely, ambulating to the 

restroom without difficulty. Laceration above right eyebrow about 5 cm explored 

and repaired. Patient will be discharged home in stable condition.  Case 

discussed with Dr. Quinones 


Undiagnosed new problem with uncertain prognosis?


@  -No


Drug Therapy requiring intensive monitoring for toxicity (Heparin, Nitro, 

Insulin, Cardizem)?


@  -No


Were any procedures done?


@  -No


Diagnosis/symptom?


@  -mechanical fall


Acute, or Chronic, or Acute on Chronic?


@  -acute


Uncomplicated (without systemic symptoms) or Complicated (systemic symptoms)?


@  -uncomplicated


Side effects of treatment?


@  -No


Exacerbation, Progression, or Severe Exacerbation?


@  -No


Poses a threat to life or bodily function? How? (Chest pain, USA, MI, pneumonia,

 PE, COPD, DKA, ARF, appy, cholecystitis, CVA, Diverticulitis, Homicidal, 

Suicidal, threat to staff... and all critical care pts)


@  -No





Disposition


Clinical Impression: 


 Laceration, Head injury





Disposition: HOME SELF-CARE


Condition: Stable


Instructions (If sedation given, give patient instructions):  Care For Your 

Stitches (ED)


Additional Instructions: 


Please follow up with your primary care provider in around 5 days for stitch 

evaluation. Return to the emergency department for new or worsening symptoms. 


Is patient prescribed a controlled substance at d/c from ED?: No


Referrals: 


Frankie Hay MD [Primary Care Provider] - 1-2 days

## 2023-10-12 NOTE — CT
EXAMINATION TYPE: CT brain arielle wo con

 

DATE OF EXAM: 10/12/2023

 

COMPARISON: None

 

HISTORY: 78-year-old female pain after Fall, laceration to superior right eyebrow

 

CT DLP: 1251.6 mGycm

Automated exposure control for dose reduction was used.

 

Technique:  Examination of the head was done in axial plane without intravenous contrast. Coronal and
 sagittal reconstructions performed.

 

CT of the cervical spine was obtained in axial plane without intravenous injection of  contrast mater
ial.  Coronal and sagittal reformatted images were obtained from the axial views for evaluation of  f
ractures, spinal alignment and canal.

 

 

FINDINGS:

 

Head:

There is no evidence of  acute intracranial hemorrhage, acute ischemic changes, mass, mass-effect, or
 extra-axial fluid collection.  There is no effacement of cerebral sulci or basal subarachnoid cister
ns.  There is no hydrocephalus.  There is no midline shift.  Gray-white matter distinction is preserv
ed.

 

Partially empty sella and magna cisterna magna. Mild ventriculomegaly likely due to central cerebral 
atrophy.

 

There is mild right supraorbital soft tissue swelling. Underlying orbits and globes and paranasal sin
uses as well as mastoid air cells appear clear.

 

 

Cervical spine:

There is severe degenerative change of the TMJs. No craniocervical junction abnormality, predental sp
ace widening, or prevertebral soft tissue swelling. There is degenerative change of the C1 dens artic
ulation.

 

Moderate spondylotic change throughout.

 

Degenerative grade 1 anterolisthesis C4-C5.

Additional grade 1 anterolisthesis C7-T1, T1-T2, and T2-T3.

Grade 1 retrolisthesis C5-C6. 

 

No evident canal compromise by CT. 

 

No acute fracture of the cervical spine.

 

There is variable mild and moderate neuroforaminal stenoses throughout.

 

Sagittal and coronal reformatted images confirm above findings.

 

 

COMBINED IMPRESSION:

1. Right supraorbital soft tissue swelling. No acute intracranial abnormality seen. Mild central cere
bral atrophy.

2. No acute fracture of the cervical spine. Moderate multilevel spondylotic change with degenerative 
grade 1 spondylolisthesis at multiple levels as above.

3. Very severe bilateral TMJ OA.

## 2024-10-28 ENCOUNTER — HOSPITAL ENCOUNTER (OUTPATIENT)
Dept: HOSPITAL 47 - RADMAMWWP | Age: 79
Discharge: HOME | End: 2024-10-28
Attending: INTERNAL MEDICINE
Payer: MEDICARE

## 2024-10-28 DIAGNOSIS — Z78.0: ICD-10-CM

## 2024-10-28 DIAGNOSIS — Z12.31: Primary | ICD-10-CM

## 2024-10-28 DIAGNOSIS — R92.333: ICD-10-CM

## 2024-10-28 DIAGNOSIS — Z90.722: ICD-10-CM

## 2024-10-28 DIAGNOSIS — Z80.3: ICD-10-CM

## 2024-10-28 DIAGNOSIS — M81.0: ICD-10-CM

## 2024-10-28 PROCEDURE — 77080 DXA BONE DENSITY AXIAL: CPT

## 2024-10-28 PROCEDURE — 77067 SCR MAMMO BI INCL CAD: CPT

## 2024-10-28 PROCEDURE — 77063 BREAST TOMOSYNTHESIS BI: CPT

## 2024-10-29 NOTE — MM
Reason for Exam: Screening  (asymptomatic). 

Last mammogram was performed 1 year(s) and 1 month(s) ago. 





Patient History: 

Menarche at age 11. First Full-Term Pregnancy at age 17. Left ovary removed at age 33. Right ovary

removed at age 33. Hysterectomy at age 33. Postmenopausal. Estrogen, starting at age 46 for 16

years. 01/23/2007, Excisional Biopsy on the Left side. 1/22/2007, Cancelled Left Mammotome on the

left side.

Maternal aunt had breast cancer, age 70. Daughter had breast cancer, age 51. 





Risk Values: 

Caitlin 5 year model risk: 3.6%.

NCI Lifetime model risk: 5.5%.





Prior Study Comparison: 

9/8/2021 Bilateral Screening Mammogram, [facility]. 9/15/2022 Bilateral MG 3D screening mammo w/cad, [facility].

9/29/2023 Bilateral MG 3D screening mammo w/cad, [facility]. 





Tissue Density: 

The breasts are heterogeneously dense, which may obscure small masses.





Findings: 

Analyzed By CAD. 

Benign bilateral vascular calcifications.

There is no suspicious group of microcalcifications or new suspicious mass in either breast. 





Overall Assessment: Benign, BI-RAD 2





Management: 

Screening Mammogram of both breasts in 1 year.

See note below in regards to patient's increased 5 year Caitlin score.



Patient should continue monthly self-breast exams.  A clinical breast exam by your physician is

recommended on an annual basis.

This exam should not preclude additional follow-up of suspicious palpable abnormalities.



Note on Caitlin scores and lifetime risk:

1. A Caitlin score greater than 3% is considered moderate risk. If this is the case, consider

specialist referral to assess eligibility for a risk reducing agent.

2. If overall lifetime risk for the development of breast cancer is 20% or higher, the patient may

qualify for future screening with alternating mammogram and breast MRI.









[facility], Workstation: [id number], 10/29/2024 10:56 AM.



Electronically signed and approved by: Theresa Ayala M.D. Radiologist

## 2024-10-29 NOTE — BD
EXAMINATION TYPE: Axial Bone Density

 

DATE OF EXAM: 10/28/2024

 

CLINICAL HISTORY: 79 years old Female.  ICD-10 CODE:  M81.0 AGE-RELATED OSTEOPOROSIS W/ , Z78.0

 

 

Height:  62

Weight:  108

 

FRAX RISK QUESTIONS:

 

 

Family History (Parent hip fracture):  yes

History of Fracture in Adulthood: yes

Secondary Osteoporosis: yes

3.  Menopause before 45: yes

 

RISK FACTORS 

 

HISTORY OF: 

Surgery to Spine/Hip(right/left)/Wrist (right/left): no

 

 

MEDICATIONS: 

Thyroid Medications: no 

Osteoporosis Medications: no

 

EXAM MEASUREMENTS: 

Bone mineral densitometry was performed using the [employer].

Bone mineral density as measured about the Lumbar spine is:  

----- L1-L4(G/cm2): 1.024

T Score Values are as follows:

----- L1: -2.3

----- L2: -1.0

----- L3: -0.1

----- L4: -1.9

----- L1-L4: -1.3

 

Z Score Values are as follows:

----- L1: 0.1

----- L2: 1.4

----- L3: 2.3

----- L4: 0.5

----- L1-L4: 1.1

 

Bone mineral density has: Increased 28.0% since study of: 10/03/2022

 

Bone mineral density about the R hip (g/cm2): 0.691

Bone mineral density about the L hip (g/cm2): 0.658

T Score values are as follows:

-----R Neck: -1.9

-----L Neck: -1.8

-----R Total: -2.5

-----L Total: -2.8

 

Z Score values are as follows:

-----R Neck: 0.6

-----L Neck: 0.7

-----R Total: -0.1

-----L Total: -0.4

 

Bone mineral density has: Increased 6.3% since study of: 10/03/2022

 

 

FRAX%s: The graph provided illustrates a 25.1% chance for a major osteoporotic fx and a 16.5% chance 
for the hips probability for fx in 10 years time.

 

 

 

 

IMPRESSION:

Osteoporosis (T Score less than -2.5).

 

There is increased fracture risk and therapy is usually indicated based on age.

 

Re-Screen 1-2 years.

 

NOTE:  T-SCORE=SD OF THE YOUNG ADULT MEAN.

 

 

 

 

 

 

[facility], Workstation: [id number], 10/29/2024 5:35 PM

## 2025-01-13 ENCOUNTER — HOSPITAL ENCOUNTER (OUTPATIENT)
Dept: HOSPITAL 47 - PROCWHC3 | Age: 80
End: 2025-01-13
Attending: INTERNAL MEDICINE
Payer: MEDICARE

## 2025-01-13 VITALS
TEMPERATURE: 97.4 F | HEART RATE: 74 BPM | DIASTOLIC BLOOD PRESSURE: 85 MMHG | SYSTOLIC BLOOD PRESSURE: 147 MMHG | RESPIRATION RATE: 16 BRPM

## 2025-01-13 DIAGNOSIS — M81.0: Primary | ICD-10-CM

## 2025-01-13 PROCEDURE — 96372 THER/PROPH/DIAG INJ SC/IM: CPT

## 2025-01-13 RX ADMIN — DENOSUMAB NR MG: 60 INJECTION SUBCUTANEOUS at 13:30

## 2025-07-12 ENCOUNTER — HOSPITAL ENCOUNTER (INPATIENT)
Dept: HOSPITAL 47 - EC | Age: 80
LOS: 3 days | Discharge: HOME | DRG: 884 | End: 2025-07-15
Attending: HOSPITALIST | Admitting: HOSPITALIST
Payer: MEDICARE

## 2025-07-12 DIAGNOSIS — I10: ICD-10-CM

## 2025-07-12 DIAGNOSIS — Z91.81: ICD-10-CM

## 2025-07-12 DIAGNOSIS — F03.90: Primary | ICD-10-CM

## 2025-07-12 DIAGNOSIS — Z60.2: ICD-10-CM

## 2025-07-12 DIAGNOSIS — Z79.82: ICD-10-CM

## 2025-07-12 DIAGNOSIS — Z79.890: ICD-10-CM

## 2025-07-12 DIAGNOSIS — E03.9: ICD-10-CM

## 2025-07-12 DIAGNOSIS — K57.30: ICD-10-CM

## 2025-07-12 DIAGNOSIS — Z79.899: ICD-10-CM

## 2025-07-12 LAB
ALBUMIN SERPL-MCNC: 4.4 G/DL (ref 3.5–5)
ALP SERPL-CCNC: 85 U/L (ref 38–126)
ALT SERPL-CCNC: 18 U/L (ref 4–34)
AMORPH SED URNS QL MICRO: (no result) /HPF
ANION GAP SERPL CALC-SCNC: 11 MMOL/L
ANISOCYTOSIS BLD QL SMEAR: (no result)
APPEARANCE UR: CLEAR
APTT BLD: 22.5 SEC (ref 22–30)
AST SERPL-CCNC: 45 U/L (ref 14–36)
BACTERIA UR QL AUTO: (no result) /HPF
BASO STIPL BLD QL SMEAR: (no result)
BASOPHILS # BLD AUTO: 0.04 10*3/UL (ref 0–0.1)
BASOPHILS NFR BLD AUTO: 0.3 %
BILIRUB BLD-MCNC: 1.3 MG/DL (ref 0.2–1.3)
BILIRUB UR QL CFM: (no result)
BILIRUB UR QL STRIP.AUTO: NEGATIVE
BROAD CASTS [PRESENCE] IN URINE BY COMPUTER ASSISTED METHOD: (no result) /LPF
BUN SERPL-SCNC: 21 MG/DL (ref 7–17)
BURR CELLS BLD QL SMEAR: (no result)
CA CARBONATE CRY #/AREA URNS HPF: (no result) /HPF
CA PHOS CRY UR QL COMP ASSIST: (no result) /HPF
CALCIUM SPEC-MCNC: 9.5 MG/DL (ref 8.4–10.2)
CAOX CRY UR QL COMP ASSIST: (no result) /HPF
CASTS URNS QL MICRO: (no result) /LPF
CHLORIDE SERPL-SCNC: 103 MMOL/L (ref 98–107)
CK SERPL-CCNC: 884 U/L (ref 30–135)
CO2 SERPL-SCNC: 21 MMOL/L (ref 22–30)
COLOR UR: COLORLESS
CREATININE: 0.55 MG/DL (ref 0.52–1.04)
CRYSTALS UR QL: (no result) /HPF
CYSTINE CRY #/AREA URNS HPF: (no result) /HPF
DACRYOCYTES BLD QL SMEAR: (no result)
DOHLE BOD BLD QL SMEAR: (no result)
EOSINOPHIL # BLD AUTO: 0 10*3/UL (ref 0.04–0.35)
EOSINOPHIL NFR BLD AUTO: 0 %
EPITHELIAL CASTS [PRESENCE] IN URINE BY COMPUTER ASSISTED METHOD: (no result) /LPF
ERYTHROCYTE CLUMPS [PRESENCE] IN URINE BY COMPUTER ASSISTED METHOD: (no result) /HPF
ERYTHROCYTE [DISTWIDTH] IN BLOOD BY AUTOMATED COUNT: 5.1 10*6/UL (ref 4.1–5.2)
ERYTHROCYTE [DISTWIDTH] IN BLOOD: 14 % (ref 11.5–14.5)
FATTY CASTS #/AREA UR COMP ASSIST: (no result) /LPF
GLUCOSE BLD-MCNC: 97 MG/DL (ref 70–110)
GLUCOSE SERPL-MCNC: 105 MG/DL (ref 74–99)
GLUCOSE UR QL: NEGATIVE
GRAN CASTS UR QL COMP ASSIST: (no result) /LPF
HCT VFR BLD AUTO: 40.8 % (ref 37.2–46.3)
HGB BLD-MCNC: 13.3 G/DL (ref 12–15)
HOWELL-JOLLY BOD BLD QL SMEAR: (no result)
HYALINE CASTS UR QL AUTO: 1 /LPF (ref 0–2)
HYPOCHROMIA BLD QL SMEAR: (no result)
IMM GRANULOCYTES # BLD: 0.04 10*3/UL (ref 0–0.04)
INR PPP: 1 (ref ?–1.2)
KETONES UR QL STRIP.AUTO: (no result)
LEUCINE CRYSTALS [PRESENCE] IN URINE BY COMPUTER ASSISTED METHOD: (no result) /HPF
LEUKOCYTE ESTERASE UR QL STRIP.AUTO: (no result)
LG PLATELETS BLD QL SMEAR: (no result)
LYMPHOCYTES # SPEC AUTO: 0.55 10*3/UL (ref 0.9–5)
LYMPHOCYTES NFR SPEC AUTO: 3.9 %
Lab: (no result)
MAGNESIUM SPEC-SCNC: 1.9 MG/DL (ref 1.6–2.3)
MCH RBC QN AUTO: 26.1 PG (ref 27–32)
MCHC RBC AUTO-ENTMCNC: 32.6 G/DL (ref 32–37)
MCV RBC AUTO: 80 FL (ref 80–97)
MIXED CELL CASTS UR QL COMP ASSIST: (no result) /LPF
MONOCYTES # BLD AUTO: 1.02 10*3/UL (ref 0.2–1)
MONOCYTES NFR BLD AUTO: 7.3 %
MUCOUS THREADS UR QL AUTO: (no result) /HPF
NEUTROPHILS # BLD AUTO: 12.4 10*3/UL (ref 1.8–7.7)
NEUTROPHILS NFR BLD AUTO: 88.2 %
NEUTS HYPERSEG # BLD: (no result) 10*3/UL
NITRITE UR QL STRIP.AUTO: NEGATIVE
NRBC BLD AUTO-RTO: (no result) %
OVAL FAT BODIES #/AREA UR COMP ASSIST: (no result) /HPF
OVALOCYTES BLD QL SMEAR: (no result)
PELGER HUET CELLS BLD QL SMEAR: (no result)
PH UR: 6.5 [PH] (ref 5–8)
PHOSPHATE SERPL-MCNC: 3 MG/DL (ref 2.5–4.5)
PLATELET # BLD AUTO: 231 10*3/UL (ref 140–440)
PLATELETS.RETICULATED NFR BLD AUTO: (no result) %
PMV BLD AUTO: 9.8 FL (ref 9.5–12.2)
POIKILOCYTOSIS BLD QL SMEAR: (no result)
POIKILOCYTOSIS BLD QL SMEAR: (no result)
POLYCHROMASIA BLD QL SMEAR: (no result)
POTASSIUM SERPL-SCNC: 4.3 MMOL/L (ref 3.5–5.1)
PROT SERPL-MCNC: 6.5 G/DL (ref 6.3–8.2)
PROT UR QL SSA: (no result)
PROT UR QL: NEGATIVE
PT BLD: 11 SEC (ref 10–12.5)
RBC CASTS UR QL COMP ASSIST: (no result) /LPF
RBC MORPH BLD: (no result)
RBC UR QL: (no result)
RBC UR QL: 4 /HPF (ref 0–5)
RENAL EPI CELLS UR QL COMP ASSIST: (no result) /HPF
ROULEAUX BLD QL SMEAR: (no result)
SCHISTOCYTES BLD QL SMEAR: (no result)
SICKLE CELLS BLD QL SMEAR: (no result)
SODIUM SERPL-SCNC: 135 MMOL/L (ref 137–145)
SP GR UR: 1.01 (ref 1–1.03)
SPERM # UR AUTO: (no result) /HPF
SPHEROCYTES BLD QL SMEAR: (no result)
SQUAMOUS UR QL AUTO: 2 /HPF (ref 0–4)
STOMATOCYTES BLD QL SMEAR: (no result)
TARGETS BLD QL SMEAR: (no result)
TOXIC GRANULES BLD QL SMEAR: (no result)
TRANS CELLS UR QL COMP ASSIST: (no result) /HPF (ref 0–1)
TRI-PHOS CRY UR QL COMP ASSIST: (no result) /HPF
TRICHOMONAS UR QL COMP ASSIST: (no result) /HPF
TYROSINE CRYSTALS [PRESENCE] IN URINE BY COMPUTER ASSISTED METHOD: (no result) /HPF
URATE CRY UR QL COMP ASSIST: (no result) /HPF
UROBILINOGEN UR QL STRIP: <2 MG/DL (ref ?–2)
VARIANT LYMPHS BLD QL SMEAR: (no result)
WAXY CASTS #/AREA UR COMP ASSIST: (no result) /LPF
WBC # BLD AUTO: 14.05 10*3/UL (ref 4.5–10)
WBC # UR AUTO: 7 /HPF (ref 0–5)
WBC CASTS #/AREA URNS LPF: (no result) /LPF
WBC CLUMPS UR QL AUTO: (no result) /HPF
WBC NRBC COR # BLD: (no result) K/UL
WBC TOXIC VACUOLES BLD QL SMEAR: (no result)
YEAST BUDDING UR QL COMP ASSIST: (no result) /HPF
YEAST HYPHAE UR QL COMP ASSIST: (no result) /HPF

## 2025-07-12 PROCEDURE — 80053 COMPREHEN METABOLIC PANEL: CPT

## 2025-07-12 PROCEDURE — 99285 EMERGENCY DEPT VISIT HI MDM: CPT

## 2025-07-12 PROCEDURE — 81001 URINALYSIS AUTO W/SCOPE: CPT

## 2025-07-12 PROCEDURE — 83605 ASSAY OF LACTIC ACID: CPT

## 2025-07-12 PROCEDURE — 70450 CT HEAD/BRAIN W/O DYE: CPT

## 2025-07-12 PROCEDURE — 36415 COLL VENOUS BLD VENIPUNCTURE: CPT

## 2025-07-12 PROCEDURE — 84484 ASSAY OF TROPONIN QUANT: CPT

## 2025-07-12 PROCEDURE — 82550 ASSAY OF CK (CPK): CPT

## 2025-07-12 PROCEDURE — 72192 CT PELVIS W/O DYE: CPT

## 2025-07-12 PROCEDURE — 85025 COMPLETE CBC W/AUTO DIFF WBC: CPT

## 2025-07-12 PROCEDURE — 72125 CT NECK SPINE W/O DYE: CPT

## 2025-07-12 PROCEDURE — 96360 HYDRATION IV INFUSION INIT: CPT

## 2025-07-12 PROCEDURE — 85730 THROMBOPLASTIN TIME PARTIAL: CPT

## 2025-07-12 PROCEDURE — 84100 ASSAY OF PHOSPHORUS: CPT

## 2025-07-12 PROCEDURE — 85610 PROTHROMBIN TIME: CPT

## 2025-07-12 PROCEDURE — 71046 X-RAY EXAM CHEST 2 VIEWS: CPT

## 2025-07-12 PROCEDURE — 83735 ASSAY OF MAGNESIUM: CPT

## 2025-07-12 RX ADMIN — CEFAZOLIN SCH MLS/HR: 330 INJECTION, POWDER, FOR SOLUTION INTRAMUSCULAR; INTRAVENOUS at 23:23

## 2025-07-12 RX ADMIN — CEFAZOLIN ONE MLS/HR: 330 INJECTION, POWDER, FOR SOLUTION INTRAMUSCULAR; INTRAVENOUS at 20:56

## 2025-07-12 SDOH — SOCIAL STABILITY - SOCIAL INSECURITY: PROBLEMS RELATED TO LIVING ALONE: Z60.2

## 2025-07-13 LAB
ACANTHOCYTES BLD QL SMEAR: (no result)
AGRAN PLATELETS BLD QL SMEAR: (no result)
ANISOCYTOSIS BLD QL SMEAR: (no result)
ANISOCYTOSIS BLD QL: (no result)
AUER BODIES BLD QL SMEAR: (no result)
BASO STIPL BLD QL SMEAR: (no result)
BASOPHILS # BLD MANUAL: (no result) K/UL (ref 0–0.2)
BASOPHILS NFR SPEC MANUAL: (no result) %
BLASTS # BLD MANUAL: (no result) %
BLASTS # BLD MANUAL: (no result) K/UL
BUN/CREAT SERPL: (no result)
BURR CELLS BLD QL SMEAR: (no result)
BURR CELLS BLD QL SMEAR: (no result)
CELLS COUNTED: (no result)
DACRYOCYTES BLD QL SMEAR: (no result)
DOHLE BOD BLD QL SMEAR: (no result)
ELLIPTOCYTES BLD QL SMEAR: (no result)
EOSINOPHIL # BLD MANUAL: (no result) K/UL (ref 0–0.7)
EOSINOPHIL NFR BLD MANUAL: (no result) %
GIANT PLATELETS BLD QL SMEAR: (no result)
HELMET CELLS BLD QL SMEAR: (no result)
HOWELL-JOLLY BOD BLD QL SMEAR: (no result)
HYPERCHROMASIA: (no result)
HYPOCHROMIA BLD QL SMEAR: (no result)
HYPOCHROMIA BLD QL: (no result)
IMM GRANULOCYTES BLD QL AUTO: (no result)
LG PLATELETS BLD QL SMEAR: (no result)
LYMPHOCYTES # BLD MANUAL: (no result) K/UL (ref 1–4.8)
LYMPHOCYTES NFR BLD MANUAL: (no result) %
Lab: (no result)
Lab: (no result)
MACROCYTES BLD QL SMEAR: (no result)
MACROCYTES BLD QL: (no result)
METAMYELOCYTES # BLD: (no result) K/UL
METAMYELOCYTES NFR BLD MANUAL: (no result) %
MICROCYTES BLD QL SMEAR: (no result)
MICROCYTOSIS: (no result)
MONOCYTES # BLD MANUAL: (no result) K/UL (ref 0–1)
MONOCYTES NFR BLD MANUAL: (no result) %
MYELOCYTES # BLD MANUAL: (no result) K/UL
MYELOCYTES NFR BLD MANUAL: (no result) %
NEUTROPHILS NFR BLD MANUAL: (no result) %
NEUTS BAND # BLD MANUAL: (no result) K/UL
NEUTS BAND NFR BLD: (no result) %
NEUTS HYPERSEG # BLD: (no result) 10*3/UL
NEUTS SEG # BLD MANUAL: (no result) K/UL (ref 1.3–7.7)
NRBC # BLD: (no result) /100 WBC (ref 0–0)
NRBC BLD AUTO-RTO: (no result) %
OTHER CELLS NFR BLD MANUAL: (no result) %
OVALOCYTES BLD QL SMEAR: (no result)
PAPPENHEIMER BOD BLD QL SMEAR: (no result)
PARASITE [PRESENCE] IN BLOOD BY LIGHT MICROSCOPY: (no result)
PELGER HUET CELLS BLD QL SMEAR: (no result)
PLASMA CELLS # BLD MANUAL: (no result) %
PLASMA CELLS # BLD MANUAL: (no result) K/UL
PLATELETS.RETICULATED NFR BLD AUTO: (no result) %
POIKILOCYTOSIS BLD QL SMEAR: (no result)
POIKILOCYTOSIS BLD QL SMEAR: (no result)
POIKILOCYTOSIS: (no result)
POLYCHROMASIA BLD QL SMEAR: (no result)
PROMYELOCYTES # BLD: (no result) K/UL
PROMYELOCYTES NFR BLD MANUAL: (no result) %
RBC AGGLUTINATION: (no result)
RBC MORPH BLD: (no result)
ROULEAUX BLD QL SMEAR: (no result)
SCHISTOCYTES BLD QL SMEAR: (no result)
SCHISTOCYTES BLD QL SMEAR: (no result)
SICKLE CELLS BLD QL SMEAR: (no result)
SMUDGE CELLS BLD QL SMEAR: (no result)
SPHEROCYTES BLD QL SMEAR: (no result)
STOMATOCYTES BLD QL SMEAR: (no result)
TARGETS BLD QL SMEAR: (no result)
TOXIC GRANULES BLD QL SMEAR: (no result)
VARIANT LYMPHS BLD QL SMEAR: (no result)
VARIANT LYMPHS BLD QL SMEAR: (no result)
WBC NRBC COR # BLD: (no result) K/UL
WBC TOXIC VACUOLES BLD QL SMEAR: (no result)

## 2025-07-13 RX ADMIN — MEMANTINE HYDROCHLORIDE SCH MG: 10 TABLET ORAL at 21:03

## 2025-07-13 RX ADMIN — DONEPEZIL HYDROCHLORIDE SCH MG: 10 TABLET ORAL at 12:00

## 2025-07-13 RX ADMIN — SERTRALINE HYDROCHLORIDE SCH MG: 25 TABLET ORAL at 21:03

## 2025-07-13 RX ADMIN — PANTOPRAZOLE SODIUM SCH MG: 40 INJECTION, POWDER, FOR SOLUTION INTRAVENOUS at 08:20

## 2025-07-14 LAB
ACANTHOCYTES BLD QL SMEAR: (no result)
AGRAN PLATELETS BLD QL SMEAR: (no result)
ALBUMIN SERPL-MCNC: 2.9 G/DL (ref 3.5–5)
ALBUMIN/GLOB SERPL: 1.5 {RATIO}
ALP SERPL-CCNC: 58 U/L (ref 38–126)
ALT SERPL-CCNC: 14 U/L (ref 4–34)
ANION GAP SERPL CALC-SCNC: 4 MMOL/L
ANISOCYTOSIS BLD QL SMEAR: (no result)
ANISOCYTOSIS BLD QL: (no result)
AST SERPL-CCNC: 29 U/L (ref 14–36)
AUER BODIES BLD QL SMEAR: (no result)
BASO STIPL BLD QL SMEAR: (no result)
BASOPHILS # BLD AUTO: 0.04 10*3/UL (ref 0–0.1)
BASOPHILS # BLD MANUAL: (no result) K/UL (ref 0–0.2)
BASOPHILS NFR BLD AUTO: 0.5 %
BASOPHILS NFR SPEC MANUAL: (no result) %
BILIRUB BLD-MCNC: 0.4 MG/DL (ref 0.2–1.3)
BLASTS # BLD MANUAL: (no result) %
BLASTS # BLD MANUAL: (no result) K/UL
BUN SERPL-SCNC: 23 MG/DL (ref 7–17)
BUN/CREAT SERPL: (no result)
BURR CELLS BLD QL SMEAR: (no result)
BURR CELLS BLD QL SMEAR: (no result)
CALCIUM SPEC-MCNC: 8.7 MG/DL (ref 8.4–10.2)
CELLS COUNTED: (no result)
CHLORIDE SERPL-SCNC: 107 MMOL/L (ref 98–107)
CO2 SERPL-SCNC: 25 MMOL/L (ref 22–30)
CREATININE: 0.61 MG/DL (ref 0.52–1.04)
DACRYOCYTES BLD QL SMEAR: (no result)
DOHLE BOD BLD QL SMEAR: (no result)
ELLIPTOCYTES BLD QL SMEAR: (no result)
EOSINOPHIL # BLD AUTO: 0.08 10*3/UL (ref 0.04–0.35)
EOSINOPHIL # BLD MANUAL: (no result) K/UL (ref 0–0.7)
EOSINOPHIL NFR BLD AUTO: 1 %
EOSINOPHIL NFR BLD MANUAL: (no result) %
ERYTHROCYTE [DISTWIDTH] IN BLOOD BY AUTOMATED COUNT: 4.23 10*6/UL (ref 4.1–5.2)
ERYTHROCYTE [DISTWIDTH] IN BLOOD: 14.6 % (ref 11.5–14.5)
GIANT PLATELETS BLD QL SMEAR: (no result)
GLOBULIN SER CALC-MCNC: 1.9 G/DL
GLUCOSE SERPL-MCNC: 96 MG/DL (ref 74–99)
HCT VFR BLD AUTO: 34.5 % (ref 37.2–46.3)
HELMET CELLS BLD QL SMEAR: (no result)
HGB BLD-MCNC: 10.7 G/DL (ref 12–15)
HOWELL-JOLLY BOD BLD QL SMEAR: (no result)
HYPERCHROMASIA: (no result)
HYPOCHROMIA BLD QL SMEAR: (no result)
HYPOCHROMIA BLD QL: (no result)
IMM GRANULOCYTES # BLD: 0.03 10*3/UL (ref 0–0.04)
IMM GRANULOCYTES BLD QL AUTO: (no result)
LG PLATELETS BLD QL SMEAR: (no result)
LYMPHOCYTES # BLD MANUAL: (no result) K/UL (ref 1–4.8)
LYMPHOCYTES # SPEC AUTO: 1.4 10*3/UL (ref 0.9–5)
LYMPHOCYTES NFR BLD MANUAL: (no result) %
LYMPHOCYTES NFR SPEC AUTO: 18.2 %
Lab: (no result)
Lab: (no result)
MACROCYTES BLD QL SMEAR: (no result)
MACROCYTES BLD QL: (no result)
MCH RBC QN AUTO: 25.3 PG (ref 27–32)
MCHC RBC AUTO-ENTMCNC: 31 G/DL (ref 32–37)
MCV RBC AUTO: 81.6 FL (ref 80–97)
METAMYELOCYTES # BLD: (no result) K/UL
METAMYELOCYTES NFR BLD MANUAL: (no result) %
MICROCYTES BLD QL SMEAR: (no result)
MICROCYTOSIS: (no result)
MONOCYTES # BLD AUTO: 0.71 10*3/UL (ref 0.2–1)
MONOCYTES # BLD MANUAL: (no result) K/UL (ref 0–1)
MONOCYTES NFR BLD AUTO: 9.2 %
MONOCYTES NFR BLD MANUAL: (no result) %
MYELOCYTES # BLD MANUAL: (no result) K/UL
MYELOCYTES NFR BLD MANUAL: (no result) %
NEUTROPHILS # BLD AUTO: 5.45 10*3/UL (ref 1.8–7.7)
NEUTROPHILS NFR BLD AUTO: 70.7 %
NEUTROPHILS NFR BLD MANUAL: (no result) %
NEUTS BAND # BLD MANUAL: (no result) K/UL
NEUTS BAND NFR BLD: (no result) %
NEUTS HYPERSEG # BLD: (no result) 10*3/UL
NEUTS SEG # BLD MANUAL: (no result) K/UL (ref 1.3–7.7)
NRBC # BLD: (no result) /100 WBC (ref 0–0)
NRBC BLD AUTO-RTO: (no result) %
OTHER CELLS NFR BLD MANUAL: (no result) %
OVALOCYTES BLD QL SMEAR: (no result)
PAPPENHEIMER BOD BLD QL SMEAR: (no result)
PARASITE [PRESENCE] IN BLOOD BY LIGHT MICROSCOPY: (no result)
PELGER HUET CELLS BLD QL SMEAR: (no result)
PLASMA CELLS # BLD MANUAL: (no result) %
PLASMA CELLS # BLD MANUAL: (no result) K/UL
PLATELET # BLD AUTO: 166 10*3/UL (ref 140–440)
PLATELETS.RETICULATED NFR BLD AUTO: (no result) %
PMV BLD AUTO: 10.1 FL (ref 9.5–12.2)
POIKILOCYTOSIS BLD QL SMEAR: (no result)
POIKILOCYTOSIS BLD QL SMEAR: (no result)
POIKILOCYTOSIS: (no result)
POLYCHROMASIA BLD QL SMEAR: (no result)
POTASSIUM SERPL-SCNC: 3.9 MMOL/L (ref 3.5–5.1)
PROMYELOCYTES # BLD: (no result) K/UL
PROMYELOCYTES NFR BLD MANUAL: (no result) %
PROT SERPL-MCNC: 4.8 G/DL (ref 6.3–8.2)
RBC AGGLUTINATION: (no result)
RBC MORPH BLD: (no result)
ROULEAUX BLD QL SMEAR: (no result)
SCHISTOCYTES BLD QL SMEAR: (no result)
SCHISTOCYTES BLD QL SMEAR: (no result)
SICKLE CELLS BLD QL SMEAR: (no result)
SMUDGE CELLS BLD QL SMEAR: (no result)
SODIUM SERPL-SCNC: 136 MMOL/L (ref 137–145)
SPHEROCYTES BLD QL SMEAR: (no result)
STOMATOCYTES BLD QL SMEAR: (no result)
TARGETS BLD QL SMEAR: (no result)
TOXIC GRANULES BLD QL SMEAR: (no result)
VARIANT LYMPHS BLD QL SMEAR: (no result)
VARIANT LYMPHS BLD QL SMEAR: (no result)
WBC # BLD AUTO: 7.71 10*3/UL (ref 4.5–10)
WBC NRBC COR # BLD: (no result) K/UL
WBC TOXIC VACUOLES BLD QL SMEAR: (no result)

## 2025-07-14 RX ADMIN — LEVOTHYROXINE SODIUM SCH MCG: 100 TABLET ORAL at 06:32

## 2025-07-14 RX ADMIN — Medication SCH MG: at 08:34

## 2025-07-14 RX ADMIN — ACETAMINOPHEN PRN MG: 325 TABLET, FILM COATED ORAL at 14:25

## 2025-07-14 RX ADMIN — ATORVASTATIN CALCIUM SCH MG: 10 TABLET, FILM COATED ORAL at 08:34

## 2025-07-14 RX ADMIN — AMLODIPINE BESYLATE SCH MG: 5 TABLET ORAL at 08:34

## 2025-07-15 VITALS
DIASTOLIC BLOOD PRESSURE: 82 MMHG | SYSTOLIC BLOOD PRESSURE: 156 MMHG | HEART RATE: 65 BPM | TEMPERATURE: 98 F | RESPIRATION RATE: 14 BRPM

## 2025-07-15 LAB
ALBUMIN SERPL-MCNC: 2.7 G/DL (ref 3.5–5)
ALBUMIN/GLOB SERPL: 1.4 {RATIO}
ALP SERPL-CCNC: 49 U/L (ref 38–126)
ALT SERPL-CCNC: 13 U/L (ref 4–34)
ANION GAP SERPL CALC-SCNC: 5 MMOL/L
AST SERPL-CCNC: 24 U/L (ref 14–36)
BASOPHILS # BLD AUTO: 0.04 10*3/UL (ref 0–0.1)
BASOPHILS NFR BLD AUTO: 0.6 %
BILIRUB BLD-MCNC: 0.3 MG/DL (ref 0.2–1.3)
BUN SERPL-SCNC: 19 MG/DL (ref 7–17)
CALCIUM SPEC-MCNC: 8.8 MG/DL (ref 8.4–10.2)
CHLORIDE SERPL-SCNC: 109 MMOL/L (ref 98–107)
CO2 SERPL-SCNC: 24 MMOL/L (ref 22–30)
CREATININE: 0.73 MG/DL (ref 0.52–1.04)
EOSINOPHIL # BLD AUTO: 0.18 10*3/UL (ref 0.04–0.35)
EOSINOPHIL NFR BLD AUTO: 2.8 %
ERYTHROCYTE [DISTWIDTH] IN BLOOD BY AUTOMATED COUNT: 4.02 10*6/UL (ref 4.1–5.2)
ERYTHROCYTE [DISTWIDTH] IN BLOOD: 14.6 % (ref 11.5–14.5)
GLOBULIN SER CALC-MCNC: 1.9 G/DL
GLUCOSE BLD-MCNC: 93 MG/DL (ref 70–110)
GLUCOSE SERPL-MCNC: 86 MG/DL (ref 74–99)
HCT VFR BLD AUTO: 32.9 % (ref 37.2–46.3)
HGB BLD-MCNC: 10.4 G/DL (ref 12–15)
IMM GRANULOCYTES # BLD: 0.02 10*3/UL (ref 0–0.04)
LYMPHOCYTES # SPEC AUTO: 1.69 10*3/UL (ref 0.9–5)
LYMPHOCYTES NFR SPEC AUTO: 26.4 %
MCH RBC QN AUTO: 25.9 PG (ref 27–32)
MCHC RBC AUTO-ENTMCNC: 31.6 G/DL (ref 32–37)
MCV RBC AUTO: 81.8 FL (ref 80–97)
MONOCYTES # BLD AUTO: 0.61 10*3/UL (ref 0.2–1)
MONOCYTES NFR BLD AUTO: 9.5 %
NEUTROPHILS # BLD AUTO: 3.87 10*3/UL (ref 1.8–7.7)
NEUTROPHILS NFR BLD AUTO: 60.4 %
PLATELET # BLD AUTO: 142 10*3/UL (ref 140–440)
PMV BLD AUTO: 10.4 FL (ref 9.5–12.2)
POTASSIUM SERPL-SCNC: 3.8 MMOL/L (ref 3.5–5.1)
PROT SERPL-MCNC: 4.6 G/DL (ref 6.3–8.2)
SODIUM SERPL-SCNC: 138 MMOL/L (ref 137–145)
WBC # BLD AUTO: 6.41 10*3/UL (ref 4.5–10)

## 2025-07-21 ENCOUNTER — HOSPITAL ENCOUNTER (OUTPATIENT)
Dept: HOSPITAL 47 - PROCWHC3 | Age: 80
End: 2025-07-21
Attending: INTERNAL MEDICINE
Payer: MEDICARE

## 2025-07-21 VITALS
TEMPERATURE: 98 F | SYSTOLIC BLOOD PRESSURE: 118 MMHG | DIASTOLIC BLOOD PRESSURE: 75 MMHG | RESPIRATION RATE: 15 BRPM | HEART RATE: 71 BPM

## 2025-07-21 DIAGNOSIS — M81.0: Primary | ICD-10-CM

## 2025-07-21 PROCEDURE — 96372 THER/PROPH/DIAG INJ SC/IM: CPT

## 2025-07-21 RX ADMIN — DENOSUMAB NR MG: 60 INJECTION SUBCUTANEOUS at 14:13
